# Patient Record
Sex: FEMALE | Race: BLACK OR AFRICAN AMERICAN | NOT HISPANIC OR LATINO | Employment: FULL TIME | URBAN - METROPOLITAN AREA
[De-identification: names, ages, dates, MRNs, and addresses within clinical notes are randomized per-mention and may not be internally consistent; named-entity substitution may affect disease eponyms.]

---

## 2016-08-23 LAB — HPV 16/18: NOT DETECTED

## 2020-03-09 LAB
LEFT EYE DM RETINOPATHY: NEGATIVE
RIGHT EYE DM RETINOPATHY: NEGATIVE

## 2020-06-01 ENCOUNTER — TELEPHONE (OUTPATIENT)
Dept: FAMILY MEDICINE | Facility: CLINIC | Age: 60
End: 2020-06-01

## 2020-06-01 RX ORDER — ATORVASTATIN CALCIUM 40 MG/1
40 TABLET, FILM COATED ORAL NIGHTLY
Qty: 90 TABLET | Refills: 0 | Status: SHIPPED | OUTPATIENT
Start: 2020-06-01 | End: 2020-06-18 | Stop reason: SDUPTHER

## 2020-06-01 RX ORDER — ATORVASTATIN CALCIUM 40 MG/1
TABLET, FILM COATED ORAL
COMMUNITY
Start: 2020-04-12 | End: 2020-06-01 | Stop reason: SDUPTHER

## 2020-06-01 RX ORDER — LISINOPRIL AND HYDROCHLOROTHIAZIDE 12.5; 2 MG/1; MG/1
1 TABLET ORAL DAILY
Qty: 90 TABLET | Refills: 0 | Status: SHIPPED | OUTPATIENT
Start: 2020-06-01 | End: 2020-06-18 | Stop reason: SDUPTHER

## 2020-06-01 RX ORDER — GABAPENTIN 300 MG/1
300 CAPSULE ORAL 2 TIMES DAILY
COMMUNITY
Start: 2020-04-22 | End: 2020-06-18 | Stop reason: SDUPTHER

## 2020-06-01 RX ORDER — LISINOPRIL AND HYDROCHLOROTHIAZIDE 12.5; 2 MG/1; MG/1
TABLET ORAL DAILY
COMMUNITY
Start: 2020-04-12 | End: 2020-06-01 | Stop reason: SDUPTHER

## 2020-06-01 NOTE — TELEPHONE ENCOUNTER
----- Message from Hawk Fermin sent at 6/1/2020  9:29 AM CDT -----  Contact: Pt  Pt would like to be called back asap regarding scheduling an earlier NPP appt due a need for medication refill.   Former pt of Dr. Lester at [facility].    Pt can be reached at [phone] or [phone](work).    Thanks

## 2020-06-04 ENCOUNTER — PATIENT OUTREACH (OUTPATIENT)
Dept: ADMINISTRATIVE | Facility: HOSPITAL | Age: 60
End: 2020-06-04

## 2020-06-04 ENCOUNTER — TELEPHONE (OUTPATIENT)
Dept: ADMINISTRATIVE | Facility: HOSPITAL | Age: 60
End: 2020-06-04

## 2020-06-09 ENCOUNTER — TELEPHONE (OUTPATIENT)
Dept: ADMINISTRATIVE | Facility: HOSPITAL | Age: 60
End: 2020-06-09

## 2020-06-18 ENCOUNTER — TELEPHONE (OUTPATIENT)
Dept: ADMINISTRATIVE | Facility: HOSPITAL | Age: 60
End: 2020-06-18

## 2020-06-18 ENCOUNTER — PATIENT OUTREACH (OUTPATIENT)
Dept: ADMINISTRATIVE | Facility: HOSPITAL | Age: 60
End: 2020-06-18

## 2020-06-18 ENCOUNTER — OFFICE VISIT (OUTPATIENT)
Dept: FAMILY MEDICINE | Facility: CLINIC | Age: 60
End: 2020-06-18
Payer: COMMERCIAL

## 2020-06-18 VITALS
HEART RATE: 69 BPM | DIASTOLIC BLOOD PRESSURE: 80 MMHG | SYSTOLIC BLOOD PRESSURE: 122 MMHG | OXYGEN SATURATION: 98 % | WEIGHT: 237.75 LBS | TEMPERATURE: 98 F

## 2020-06-18 DIAGNOSIS — Z11.59 SCREENING FOR VIRAL DISEASE: ICD-10-CM

## 2020-06-18 DIAGNOSIS — E78.5 HYPERLIPIDEMIA ASSOCIATED WITH TYPE 2 DIABETES MELLITUS: ICD-10-CM

## 2020-06-18 DIAGNOSIS — R00.2 PALPITATIONS: ICD-10-CM

## 2020-06-18 DIAGNOSIS — E11.59 HYPERTENSION ASSOCIATED WITH DIABETES: ICD-10-CM

## 2020-06-18 DIAGNOSIS — E11.69 HYPERLIPIDEMIA ASSOCIATED WITH TYPE 2 DIABETES MELLITUS: ICD-10-CM

## 2020-06-18 DIAGNOSIS — I15.2 HYPERTENSION ASSOCIATED WITH DIABETES: ICD-10-CM

## 2020-06-18 DIAGNOSIS — E11.9 TYPE 2 DIABETES MELLITUS WITHOUT COMPLICATION, WITHOUT LONG-TERM CURRENT USE OF INSULIN: Primary | ICD-10-CM

## 2020-06-18 PROCEDURE — 99999 PR PBB SHADOW E&M-EST. PATIENT-LVL III: CPT | Mod: PBBFAC,,, | Performed by: INTERNAL MEDICINE

## 2020-06-18 PROCEDURE — 99214 PR OFFICE/OUTPT VISIT, EST, LEVL IV, 30-39 MIN: ICD-10-PCS | Mod: S$GLB,,, | Performed by: INTERNAL MEDICINE

## 2020-06-18 PROCEDURE — 99999 PR PBB SHADOW E&M-EST. PATIENT-LVL III: ICD-10-PCS | Mod: PBBFAC,,, | Performed by: INTERNAL MEDICINE

## 2020-06-18 PROCEDURE — 99214 OFFICE O/P EST MOD 30 MIN: CPT | Mod: S$GLB,,, | Performed by: INTERNAL MEDICINE

## 2020-06-18 RX ORDER — LISINOPRIL AND HYDROCHLOROTHIAZIDE 12.5; 2 MG/1; MG/1
1 TABLET ORAL DAILY
Qty: 90 TABLET | Refills: 3 | Status: SHIPPED | OUTPATIENT
Start: 2020-06-18 | End: 2020-12-17 | Stop reason: SDUPTHER

## 2020-06-18 RX ORDER — GABAPENTIN 300 MG/1
300 CAPSULE ORAL 2 TIMES DAILY
Qty: 180 CAPSULE | Refills: 3 | Status: SHIPPED | OUTPATIENT
Start: 2020-06-18 | End: 2020-12-17 | Stop reason: SDUPTHER

## 2020-06-18 RX ORDER — ASPIRIN 81 MG/1
81 TABLET ORAL DAILY
COMMUNITY

## 2020-06-18 RX ORDER — ATORVASTATIN CALCIUM 40 MG/1
40 TABLET, FILM COATED ORAL NIGHTLY
Qty: 90 TABLET | Refills: 3 | Status: SHIPPED | OUTPATIENT
Start: 2020-06-18 | End: 2020-12-17 | Stop reason: SDUPTHER

## 2020-06-18 RX ORDER — METFORMIN HYDROCHLORIDE 1000 MG/1
1000 TABLET, FILM COATED, EXTENDED RELEASE ORAL
Qty: 90 TABLET | Refills: 3 | Status: SHIPPED | OUTPATIENT
Start: 2020-06-18 | End: 2020-12-17 | Stop reason: SDUPTHER

## 2020-06-18 RX ORDER — METFORMIN HYDROCHLORIDE 1000 MG/1
1000 TABLET, FILM COATED, EXTENDED RELEASE ORAL
COMMUNITY
End: 2020-06-18 | Stop reason: SDUPTHER

## 2020-06-18 NOTE — PROGRESS NOTES
[facility] Note    Chief Complaint      Chief Complaint   Patient presents with    Establish Care     f/u from       History of Present Illness      Stephania Rodgers is a 59 y.o. female who presents today to establish care for HLD, HTN.  Patient comes to appointment alone.  Previous patient of mine at .    Feels depressed, uninterested.  Sleeping more. Has a good support system.  Was exercising, has not been lately.  Problem List Items Addressed This Visit     Hypertension associated with diabetes    Current Assessment & Plan     BP controlled on lisinopril/HCTZ 20/12.5 mg daily, no CP/SOB/HA.         Relevant Medications    metFORMIN (GLUMETZA) 1000 MG (MOD) 24hr tablet    lisinopriL-hydrochlorothiazide (PRINZIDE,ZESTORETIC) 20-12.5 mg per tablet    Hyperlipidemia associated with type 2 diabetes mellitus    Current Assessment & Plan     Stable on lipitor 40 mg daily, no myalgias.         Relevant Medications    metFORMIN (GLUMETZA) 1000 MG (MOD) 24hr tablet    atorvastatin (LIPITOR) 40 MG tablet    Type 2 diabetes mellitus without complication, without long-term current use of insulin - Primary    Current Assessment & Plan     A1C 7.6 with Dr. John 5/2020, has not been compliant with metformin 1000 mg daily.  No hypoglycemia, takes neurontin for neuropathy.         Relevant Medications    metFORMIN (GLUMETZA) 1000 MG (MOD) 24hr tablet    gabapentin (NEURONTIN) 300 MG capsule    Other Relevant Orders    CBC auto differential    Comprehensive metabolic panel    Lipid Panel    Microalbumin/creatinine urine ratio    Hemoglobin A1C    TSH    Palpitations    Current Assessment & Plan     Had holter monitor recently with Dr. John, which showed some PVC's.           Other Visit Diagnoses     Screening for viral disease        Relevant Orders    Hepatitis C Antibody    HIV 1/2 Ag/Ab (4th Gen)          Health Maintenance   Topic Date Due    Hepatitis C Screening  1960    Lipid  Panel  1960    Eye Exam  11/26/1969    Foot Exam  06/18/2021    Pap Smear with HPV Cotest  08/23/2021    Mammogram  01/11/2022    TETANUS VACCINE  05/17/2029       History reviewed. No pertinent past medical history.    Past Surgical History:   Procedure Laterality Date    colonosocpy  colonoscopy    Polyp 2 mm in the descending colon. Repeat 5 years    FOOT SURGERY      MANDIBLE FRACTURE SURGERY         family history includes Diabetes in her brother, father, and mother; Heart disease in her father; Hyperlipidemia in her father and mother; Hypertension in her brother, father, and mother.    Social History     Tobacco Use    Smoking status: Never Smoker   Substance Use Topics    Alcohol use: Yes     Alcohol/week: 3.0 standard drinks     Types: 3 Shots of liquor per week    Drug use: Never       Review of Systems   Constitutional: Negative for chills and fever.   HENT: Negative for congestion and sore throat.    Eyes: Negative for blurred vision and discharge.   Respiratory: Negative for cough and shortness of breath.    Cardiovascular: Negative for chest pain and palpitations.   Gastrointestinal: Negative for constipation, diarrhea, nausea and vomiting.   Genitourinary: Negative for dysuria and hematuria.   Musculoskeletal: Negative for falls and myalgias.   Skin: Negative for itching and rash.   Neurological: Negative for dizziness and headaches.        Outpatient Encounter Medications as of 6/18/2020   Medication Sig Dispense Refill    aspirin (ECOTRIN) 81 MG EC tablet Take 81 mg by mouth once daily.      atorvastatin (LIPITOR) 40 MG tablet Take 1 tablet (40 mg total) by mouth every evening. 90 tablet 3    gabapentin (NEURONTIN) 300 MG capsule Take 1 capsule (300 mg total) by mouth 2 (two) times daily. 180 capsule 3    lisinopriL-hydrochlorothiazide (PRINZIDE,ZESTORETIC) 20-12.5 mg per tablet Take 1 tablet by mouth once daily. 90 tablet 3    metFORMIN (GLUMETZA) 1000 MG (MOD) 24hr tablet Take  1 tablet (1,000 mg total) by mouth daily with breakfast. 90 tablet 3    [DISCONTINUED] atorvastatin (LIPITOR) 40 MG tablet Take 1 tablet (40 mg total) by mouth every evening. 90 tablet 0    [DISCONTINUED] gabapentin (NEURONTIN) 300 MG capsule Take 300 mg by mouth 2 (two) times daily.       [DISCONTINUED] lisinopriL-hydrochlorothiazide (PRINZIDE,ZESTORETIC) 20-12.5 mg per tablet Take 1 tablet by mouth once daily. 90 tablet 0    [DISCONTINUED] metFORMIN (GLUMETZA) 1000 MG (MOD) 24hr tablet Take 1,000 mg by mouth daily with breakfast.       No facility-administered encounter medications on file as of 6/18/2020.         Review of patient's allergies indicates:  No Known Allergies    Physical Exam      Vital Signs  Temp: 97.5 °F (36.4 °C)  Temp src: Oral  Pulse: 69  SpO2: 98 %  BP: 122/80  BP Location: Left arm  Patient Position: Sitting  Pain Score: 0-No pain  Height and Weight  Weight: 107.9 kg (237 lb 12.3 oz)]    Physical Exam  Constitutional:       Appearance: She is well-developed.   HENT:      Head: Normocephalic and atraumatic.      Right Ear: External ear normal.      Left Ear: External ear normal.   Eyes:      General:         Right eye: No discharge.         Left eye: No discharge.   Neck:      Musculoskeletal: Normal range of motion.      Thyroid: No thyromegaly.   Cardiovascular:      Rate and Rhythm: Normal rate and regular rhythm.      Heart sounds: Normal heart sounds. No murmur.   Pulmonary:      Effort: Pulmonary effort is normal. No respiratory distress.      Breath sounds: Normal breath sounds.   Abdominal:      General: Bowel sounds are normal. There is no distension.      Palpations: Abdomen is soft.      Tenderness: There is no abdominal tenderness.   Musculoskeletal: Normal range of motion.         General: No deformity.   Skin:     General: Skin is warm and dry.      Findings: No rash.   Neurological:      Mental Status: She is alert and oriented to person, place, and time.   Psychiatric:          Behavior: Behavior normal.          Laboratory:  CBC:  No results for input(s): WBC, RBC, HGB, HCT, PLT, MCV, MCH, MCHC in the last 2160 hours.  CMP:  No results for input(s): GLU, CALCIUM, ALBUMIN, PROT, NA, K, CO2, CL, BUN, ALKPHOS, ALT, AST, BILITOT in the last 2160 hours.    Invalid input(s): CREATININ  URINALYSIS:  No results for input(s): COLORU, CLARITYU, SPECGRAV, PHUR, PROTEINUA, GLUCOSEU, BILIRUBINCON, BLOODU, WBCU, RBCU, BACTERIA, MUCUS, NITRITE, LEUKOCYTESUR, UROBILINOGEN, HYALINECASTS in the last 2160 hours.   LIPIDS:  No results for input(s): TSH, HDL, CHOL, TRIG, LDLCALC, CHOLHDL, NONHDLCHOL, TOTALCHOLEST in the last 2160 hours.  TSH:  No results for input(s): TSH in the last 2160 hours.  A1C:  No results for input(s): HGBA1C in the last 2160 hours.    Radiology:  No new imaging on file    Assessment/Plan     Stephania Rodgers is a 59 y.o.female with:    1. Type 2 diabetes mellitus without complication, without long-term current use of insulin  - CBC auto differential; Future  - Comprehensive metabolic panel; Future  - Lipid Panel; Future  - Microalbumin/creatinine urine ratio; Future  - Hemoglobin A1C; Future  - TSH; Future  - CBC auto differential  - Comprehensive metabolic panel  - Lipid Panel  - Microalbumin/creatinine urine ratio  - Hemoglobin A1C  - TSH  - metFORMIN (GLUMETZA) 1000 MG (MOD) 24hr tablet; Take 1 tablet (1,000 mg total) by mouth daily with breakfast.  Dispense: 90 tablet; Refill: 3  - gabapentin (NEURONTIN) 300 MG capsule; Take 1 capsule (300 mg total) by mouth 2 (two) times daily.  Dispense: 180 capsule; Refill: 3    2. Palpitations    3. Hypertension associated with diabetes  - lisinopriL-hydrochlorothiazide (PRINZIDE,ZESTORETIC) 20-12.5 mg per tablet; Take 1 tablet by mouth once daily.  Dispense: 90 tablet; Refill: 3    4. Hyperlipidemia associated with type 2 diabetes mellitus  - atorvastatin (LIPITOR) 40 MG tablet; Take 1 tablet (40 mg total) by mouth every evening.   Dispense: 90 tablet; Refill: 3    5. Screening for viral disease  - Hepatitis C Antibody; Future  - HIV 1/2 Ag/Ab (4th Gen); Future  - Hepatitis C Antibody  - HIV 1/2 Ag/Ab (4th Gen)    -get eye exam from Dr. Neely  -Counseled on increasing exercise  -Counseled on taking metformin  -will find out from [employer] which glucometer is covered on her plan  -Get 5/2020 labs-Continue current medications and maintain follow up with specialists.  Return to clinic in 6 months with labs prior at [facility].      Galina Lester MD  [facility]

## 2020-06-26 ENCOUNTER — TELEPHONE (OUTPATIENT)
Dept: FAMILY MEDICINE | Facility: CLINIC | Age: 60
End: 2020-06-26

## 2020-06-29 ENCOUNTER — TELEPHONE (OUTPATIENT)
Dept: FAMILY MEDICINE | Facility: CLINIC | Age: 60
End: 2020-06-29

## 2020-06-29 NOTE — TELEPHONE ENCOUNTER
----- Message from Alisha Dempsey sent at 6/29/2020 11:28 AM CDT -----  Regarding: Medical Necessity - megha  Contact: [facility] [phone]  Confirmation for medical necessity for diabetic supplies

## 2020-07-07 ENCOUNTER — PATIENT OUTREACH (OUTPATIENT)
Dept: ADMINISTRATIVE | Facility: HOSPITAL | Age: 60
End: 2020-07-07

## 2020-07-07 ENCOUNTER — TELEPHONE (OUTPATIENT)
Dept: ADMINISTRATIVE | Facility: HOSPITAL | Age: 60
End: 2020-07-07

## 2020-07-27 ENCOUNTER — TELEPHONE (OUTPATIENT)
Dept: FAMILY MEDICINE | Facility: CLINIC | Age: 60
End: 2020-07-27

## 2020-07-27 RX ORDER — ALPRAZOLAM 0.25 MG/1
0.25 TABLET ORAL DAILY PRN
Qty: 30 TABLET | Refills: 0 | Status: SHIPPED | OUTPATIENT
Start: 2020-07-27 | End: 2020-09-08

## 2020-07-27 NOTE — TELEPHONE ENCOUNTER
Will give short term rx for xanax PRN.  If she is taking this daily for an extended period of time, needs to set up appt so we can discuss long term management of anxiety.

## 2020-07-27 NOTE — TELEPHONE ENCOUNTER
----- Message from Josr Olvera sent at 7/27/2020  9:27 AM CDT -----  Regarding: Advice  Contact: Patient [phone]  Patient would like to get medical advice.     Comments: Patient calling stating is needing to speak regarding Rx and other questions will discuss with call back.    Please call an advise  Thank you

## 2020-12-17 ENCOUNTER — OFFICE VISIT (OUTPATIENT)
Dept: FAMILY MEDICINE | Facility: CLINIC | Age: 60
End: 2020-12-17
Payer: COMMERCIAL

## 2020-12-17 VITALS
SYSTOLIC BLOOD PRESSURE: 118 MMHG | TEMPERATURE: 97 F | HEART RATE: 61 BPM | DIASTOLIC BLOOD PRESSURE: 82 MMHG | OXYGEN SATURATION: 98 % | WEIGHT: 231.13 LBS

## 2020-12-17 DIAGNOSIS — E78.5 HYPERLIPIDEMIA ASSOCIATED WITH TYPE 2 DIABETES MELLITUS: ICD-10-CM

## 2020-12-17 DIAGNOSIS — I15.2 HYPERTENSION ASSOCIATED WITH DIABETES: ICD-10-CM

## 2020-12-17 DIAGNOSIS — E11.59 HYPERTENSION ASSOCIATED WITH DIABETES: ICD-10-CM

## 2020-12-17 DIAGNOSIS — E11.9 TYPE 2 DIABETES MELLITUS WITHOUT COMPLICATION, WITHOUT LONG-TERM CURRENT USE OF INSULIN: ICD-10-CM

## 2020-12-17 DIAGNOSIS — F41.9 ANXIETY: ICD-10-CM

## 2020-12-17 DIAGNOSIS — E11.69 HYPERLIPIDEMIA ASSOCIATED WITH TYPE 2 DIABETES MELLITUS: ICD-10-CM

## 2020-12-17 DIAGNOSIS — Z11.59 SCREENING FOR VIRAL DISEASE: Primary | ICD-10-CM

## 2020-12-17 DIAGNOSIS — R00.2 PALPITATIONS: ICD-10-CM

## 2020-12-17 DIAGNOSIS — Z11.4 SCREENING FOR HIV (HUMAN IMMUNODEFICIENCY VIRUS): ICD-10-CM

## 2020-12-17 PROCEDURE — 99214 PR OFFICE/OUTPT VISIT, EST, LEVL IV, 30-39 MIN: ICD-10-PCS | Mod: S$GLB,,, | Performed by: INTERNAL MEDICINE

## 2020-12-17 PROCEDURE — 99214 OFFICE O/P EST MOD 30 MIN: CPT | Mod: S$GLB,,, | Performed by: INTERNAL MEDICINE

## 2020-12-17 PROCEDURE — 99999 PR PBB SHADOW E&M-EST. PATIENT-LVL III: ICD-10-PCS | Mod: PBBFAC,,, | Performed by: INTERNAL MEDICINE

## 2020-12-17 PROCEDURE — 99999 PR PBB SHADOW E&M-EST. PATIENT-LVL III: CPT | Mod: PBBFAC,,, | Performed by: INTERNAL MEDICINE

## 2020-12-17 RX ORDER — ATORVASTATIN CALCIUM 40 MG/1
40 TABLET, FILM COATED ORAL NIGHTLY
Qty: 90 TABLET | Refills: 3 | Status: SHIPPED | OUTPATIENT
Start: 2020-12-17 | End: 2021-12-23 | Stop reason: SDUPTHER

## 2020-12-17 RX ORDER — METFORMIN HYDROCHLORIDE 1000 MG/1
1000 TABLET, FILM COATED, EXTENDED RELEASE ORAL
Qty: 90 TABLET | Refills: 3 | Status: SHIPPED | OUTPATIENT
Start: 2020-12-17 | End: 2020-12-20 | Stop reason: SDUPTHER

## 2020-12-17 RX ORDER — ALPRAZOLAM 0.5 MG/1
0.5 TABLET ORAL DAILY PRN
Qty: 30 TABLET | Refills: 5 | Status: SHIPPED | OUTPATIENT
Start: 2020-12-17 | End: 2021-08-26

## 2020-12-17 RX ORDER — LISINOPRIL AND HYDROCHLOROTHIAZIDE 12.5; 2 MG/1; MG/1
1 TABLET ORAL DAILY
Qty: 90 TABLET | Refills: 3 | Status: SHIPPED | OUTPATIENT
Start: 2020-12-17 | End: 2021-12-23 | Stop reason: SDUPTHER

## 2020-12-17 RX ORDER — GABAPENTIN 300 MG/1
300 CAPSULE ORAL 2 TIMES DAILY
Qty: 180 CAPSULE | Refills: 3 | Status: SHIPPED | OUTPATIENT
Start: 2020-12-17 | End: 2021-12-23 | Stop reason: SDUPTHER

## 2020-12-17 NOTE — ASSESSMENT & PLAN NOTE
A1C 7.6 with Dr. John 5/2020, has not been compliant with metformin 1000 mg daily.  No hypoglycemia, takes neurontin for neuropathy.

## 2020-12-17 NOTE — ASSESSMENT & PLAN NOTE
had stroke 7/21/2020, mom had hand amputated a few weeks prior then broke her ankle.  Still working full time.  Was under a lot of stress.  Put on low dose xanax by me at last visit, feels like it doesn't help as much.  Was on lexapro years ago, felt like it made her like a zombie.

## 2020-12-17 NOTE — PROGRESS NOTES
[facility] Note    Chief Complaint      Chief Complaint   Patient presents with    Follow-up    Diabetes     History of Present Illness      Stephania Rodgers is a 60 y.o. female who presents today for HLD, HTN.  Patient comes to appointment alone.  GYN: Dr. Alisa Whitman    Problem List Items Addressed This Visit     Hypertension associated with diabetes    Current Assessment & Plan     BP controlled on lisinopril/HCTZ 20/12.5 mg daily, no CP/SOB/HA.         Hyperlipidemia associated with type 2 diabetes mellitus    Current Assessment & Plan     Stable on lipitor 40 mg daily, no myalgias.         Type 2 diabetes mellitus without complication, without long-term current use of insulin    Current Assessment & Plan     A1C 7.6 with Dr. John 5/2020, has not been compliant with metformin 1000 mg daily.  No hypoglycemia, takes neurontin for neuropathy.         Palpitations    Current Assessment & Plan     Had holter monitor in 2020 with Dr. John, which showed some PVC's.               Health Maintenance   Topic Date Due    Hepatitis C Screening  1960    Lipid Panel  1960    Eye Exam  03/09/2021    Foot Exam  06/18/2021    Pap Smear with HPV Cotest  08/23/2021    Mammogram  01/11/2022    TETANUS VACCINE  05/17/2029       History reviewed. No pertinent past medical history.    Past Surgical History:   Procedure Laterality Date    colonosocpy  colonoscopy    Polyp 2 mm in the descending colon. Repeat 5 years    FOOT SURGERY      MANDIBLE FRACTURE SURGERY         family history includes Diabetes in her brother, father, and mother; Heart disease in her father; Hyperlipidemia in her father and mother; Hypertension in her brother, father, and mother.    Social History     Tobacco Use    Smoking status: Never Smoker   Substance Use Topics    Alcohol use: Yes     Alcohol/week: 3.0 standard drinks     Types: 3 Shots of liquor per week    Drug use: Never       Review of  Systems   Constitutional: Negative for chills and fever.   HENT: Negative for congestion and sore throat.    Eyes: Negative for blurred vision and discharge.   Respiratory: Negative for cough and shortness of breath.    Cardiovascular: Negative for chest pain and palpitations.   Gastrointestinal: Negative for constipation, diarrhea, nausea and vomiting.   Genitourinary: Negative for dysuria and hematuria.   Musculoskeletal: Negative for falls and myalgias.   Skin: Negative for itching and rash.   Neurological: Negative for dizziness and headaches.        Outpatient Encounter Medications as of 12/17/2020   Medication Sig Dispense Refill    ALPRAZolam (XANAX) 0.25 MG tablet TAKE 1 TABLET BY MOUTH ONCE DAILY AS NEEDED FOR ANXIETY 30 tablet 5    aspirin (ECOTRIN) 81 MG EC tablet Take 81 mg by mouth once daily.      atorvastatin (LIPITOR) 40 MG tablet Take 1 tablet (40 mg total) by mouth every evening. 90 tablet 3    gabapentin (NEURONTIN) 300 MG capsule Take 1 capsule (300 mg total) by mouth 2 (two) times daily. 180 capsule 3    lisinopriL-hydrochlorothiazide (PRINZIDE,ZESTORETIC) 20-12.5 mg per tablet Take 1 tablet by mouth once daily. 90 tablet 3    metFORMIN (GLUMETZA) 1000 MG (MOD) 24hr tablet Take 1 tablet (1,000 mg total) by mouth daily with breakfast. 90 tablet 3     No facility-administered encounter medications on file as of 12/17/2020.         Review of patient's allergies indicates:  No Known Allergies    Physical Exam      Vital Signs  Temp: 96.8 °F (36 °C)  Temp src: Temporal  Pulse: 61  SpO2: 98 %  BP: 118/82  BP Location: Left arm  Patient Position: Sitting  Pain Score: 0-No pain  Height and Weight  Weight: 104.9 kg (231 lb 2.4 oz)]    Physical Exam  Constitutional:       Appearance: She is well-developed.   HENT:      Head: Normocephalic and atraumatic.      Right Ear: External ear normal.      Left Ear: External ear normal.   Eyes:      General:         Right eye: No discharge.         Left eye:  No discharge.   Neck:      Musculoskeletal: Normal range of motion.      Thyroid: No thyromegaly.   Cardiovascular:      Rate and Rhythm: Normal rate and regular rhythm.      Heart sounds: Normal heart sounds. No murmur.   Pulmonary:      Effort: Pulmonary effort is normal. No respiratory distress.      Breath sounds: Normal breath sounds.   Abdominal:      General: Bowel sounds are normal. There is no distension.      Palpations: Abdomen is soft.      Tenderness: There is no abdominal tenderness.   Musculoskeletal: Normal range of motion.         General: No deformity.   Skin:     General: Skin is warm and dry.      Findings: No rash.   Neurological:      Mental Status: She is alert and oriented to person, place, and time.   Psychiatric:         Behavior: Behavior normal.          Laboratory:  CBC:  No results for input(s): WBC, RBC, HGB, HCT, PLT, MCV, MCH, MCHC in the last 2160 hours.  CMP:  No results for input(s): GLU, CALCIUM, ALBUMIN, PROT, NA, K, CO2, CL, BUN, ALKPHOS, ALT, AST, BILITOT in the last 2160 hours.    Invalid input(s): CREATININ  URINALYSIS:  No results for input(s): COLORU, CLARITYU, SPECGRAV, PHUR, PROTEINUA, GLUCOSEU, BILIRUBINCON, BLOODU, WBCU, RBCU, BACTERIA, MUCUS, NITRITE, LEUKOCYTESUR, UROBILINOGEN, HYALINECASTS in the last 2160 hours.   LIPIDS:  No results for input(s): TSH, HDL, CHOL, TRIG, LDLCALC, CHOLHDL, NONHDLCHOL, TOTALCHOLEST in the last 2160 hours.  TSH:  No results for input(s): TSH in the last 2160 hours.  A1C:  No results for input(s): HGBA1C in the last 2160 hours.    Radiology:  No new imaging on file    Assessment/Plan     Stephania Rodgers is a 60 y.o.female with:    1. Type 2 diabetes mellitus without complication, without long-term current use of insulin    2. Palpitations    3. Hypertension associated with diabetes    4. Hyperlipidemia associated with type 2 diabetes mellitus        -  -will find out from [employer] which glucometer is covered on her plan  -Did not do labs  prior to appt at [facility], counseled to go get them done  -Continue current medications and maintain follow up with specialists.  Return to clinic in 6 months with labs prior at [facility].      Galina Lester MD  [facility] [place]

## 2020-12-20 ENCOUNTER — TELEPHONE (OUTPATIENT)
Dept: FAMILY MEDICINE | Facility: CLINIC | Age: 60
End: 2020-12-20

## 2020-12-20 DIAGNOSIS — E11.9 TYPE 2 DIABETES MELLITUS WITHOUT COMPLICATION, WITHOUT LONG-TERM CURRENT USE OF INSULIN: ICD-10-CM

## 2020-12-20 RX ORDER — METFORMIN HYDROCHLORIDE 500 MG/1
TABLET, EXTENDED RELEASE ORAL
Qty: 180 TABLET | Refills: 3 | Status: SHIPPED | OUTPATIENT
Start: 2020-12-20 | End: 2021-12-23

## 2020-12-21 LAB
RANDOM URINE: 1.4 MG/DL
RANDOM URINE: 289.5 MG/DL
RANDOM URINE: 5 MCG/MG CREAT (ref 0–29)

## 2020-12-21 NOTE — TELEPHONE ENCOUNTER
Pharmacy faxed paper work asking if we can switch pts Metformin from 1,000mg to 5000 mg ER. States the 1,000mg is too expensive.

## 2020-12-22 LAB
ALBUMIN: 4.6 GRAM/DL (ref 3.5–5)
ALP SERPL-CCNC: 115 UNIT/L (ref 38–126)
ALT SERPL W P-5'-P-CCNC: 12 UNIT/L (ref 7–56)
ANION GAP SERPL CALC-SCNC: 16 MEQ/L (ref 9–18)
AST SERPL-CCNC: 18 UNIT/L (ref 7–40)
BASOPHILS ABSOLUTE COUNT: 0 K/UL (ref 0–0.2)
BASOPHILS NFR BLD: 0.4 % (ref 0–2)
BILIRUB SERPL-MCNC: 0.9 MG/DL (ref 0–1.2)
BUN BLD-MCNC: 12 MG/DL (ref 7–21)
BUN/CREAT SERPL: 15 RATIO (ref 6–22)
CALC OSMOLALITY: 280 MOSM/KG (ref 275–295)
CALCIUM SERPL-MCNC: 9.8 MG/DL (ref 8.5–10.3)
CHLORIDE SERPL-SCNC: 102 MEQ/L (ref 98–107)
CHOL/HDLC RATIO: 3
CHOLEST SERPL-MSCNC: 160 MG/DL (ref 100–200)
CO2 SERPL-SCNC: 26 MEQ/L (ref 21–31)
CREAT SERPL-MCNC: 0.8 MG/DL (ref 0.5–1)
DIFFERENTIAL TYPE: ABNORMAL
EOSINOPHIL NFR BLD: 1.4 % (ref 0–4)
EOSINOPHILS ABSOLUTE COUNT: 0.1 K/UL (ref 0–0.7)
ERYTHROCYTE [DISTWIDTH] IN BLOOD BY AUTOMATED COUNT: 14.8 GRAM/DL (ref 12–15.3)
GFR: 70.9 ML/MIN/1.73M2
GLUCOSE SERPL-MCNC: 118 MG/DL (ref 70–100)
HBA1C MFR BLD: 6.8 % (ref 4.5–5.7)
HCT VFR BLD AUTO: 42.3 % (ref 37–47)
HCV AB S/CO SERPL IA: 0.01
HCV AB SERPL QL IA: NORMAL
HDLC SERPL-MCNC: 56 MG/DL (ref 40–75)
HGB BLD-MCNC: 13.8 GRAM/DL (ref 12–16)
HIV 1+2 AB+HIV1 P24 AG SERPL QL IA: NORMAL
LDLC SERPL CALC-MCNC: 94 MG/DL (ref 0–125)
LYMPHOCYTES %: 36.2 % (ref 15–45)
LYMPHOCYTES ABSOLUTE COUNT: 2 K/UL (ref 1–4.2)
MCH RBC QN AUTO: 25.3 PICOGRAM (ref 27–33)
MCHC RBC AUTO-ENTMCNC: 32.7 GRAM/DL (ref 32–36)
MCV RBC AUTO: 77.3 FEMTOLITER (ref 81–99)
MONOCYTES %: 8.4 % (ref 3–13)
MONOCYTES ABSOLUTE COUNT: 0.5 K/UL (ref 0.1–0.8)
NEUTROPHILS ABSOLUTE COUNT: 2.9 K/UL (ref 2.1–7.6)
NEUTROPHILS RELATIVE PERCENT: 53.6 % (ref 32–80)
NONHDLC SERPL-MCNC: 104 MG/DL (ref 60–125)
PLATELET # BLD AUTO: 327 K/UL (ref 150–350)
PMV BLD AUTO: 8.3 FEMTOLITER (ref 7–10.2)
POTASSIUM SERPL-SCNC: 4 MEQ/L (ref 3.5–5)
RBC # BLD AUTO: 5.47 MIL/UL (ref 4.2–5.4)
SODIUM BLD-SCNC: 140 MEQ/L (ref 135–145)
TOTAL PROTEIN: 7.4 GRAM/DL (ref 6.3–8.2)
TRIGL SERPL-MCNC: 63 MG/DL (ref 30–150)
TSH SERPL DL<=0.005 MIU/L-ACNC: 1.58 UIL/ML (ref 0.35–4)
WBC # BLD AUTO: 5.5 K/UL (ref 4.5–11)

## 2021-01-20 DIAGNOSIS — Z12.31 OTHER SCREENING MAMMOGRAM: ICD-10-CM

## 2021-02-17 ENCOUNTER — TELEPHONE (OUTPATIENT)
Dept: FAMILY MEDICINE | Facility: CLINIC | Age: 61
End: 2021-02-17

## 2021-04-05 ENCOUNTER — PATIENT MESSAGE (OUTPATIENT)
Dept: ADMINISTRATIVE | Facility: HOSPITAL | Age: 61
End: 2021-04-05

## 2021-05-26 DIAGNOSIS — E11.9 TYPE 2 DIABETES MELLITUS WITHOUT COMPLICATION, UNSPECIFIED WHETHER LONG TERM INSULIN USE: ICD-10-CM

## 2021-06-01 ENCOUNTER — TELEPHONE (OUTPATIENT)
Dept: ADMINISTRATIVE | Facility: HOSPITAL | Age: 61
End: 2021-06-01

## 2021-06-01 ENCOUNTER — PATIENT OUTREACH (OUTPATIENT)
Dept: ADMINISTRATIVE | Facility: HOSPITAL | Age: 61
End: 2021-06-01

## 2021-06-03 ENCOUNTER — PATIENT OUTREACH (OUTPATIENT)
Dept: ADMINISTRATIVE | Facility: HOSPITAL | Age: 61
End: 2021-06-03

## 2021-06-03 ENCOUNTER — TELEPHONE (OUTPATIENT)
Dept: ADMINISTRATIVE | Facility: HOSPITAL | Age: 61
End: 2021-06-03

## 2021-06-17 ENCOUNTER — OFFICE VISIT (OUTPATIENT)
Dept: FAMILY MEDICINE | Facility: CLINIC | Age: 61
End: 2021-06-17
Payer: COMMERCIAL

## 2021-06-17 DIAGNOSIS — E11.69 HYPERLIPIDEMIA ASSOCIATED WITH TYPE 2 DIABETES MELLITUS: ICD-10-CM

## 2021-06-17 DIAGNOSIS — R00.2 PALPITATIONS: ICD-10-CM

## 2021-06-17 DIAGNOSIS — E11.9 TYPE 2 DIABETES MELLITUS WITHOUT COMPLICATION, WITHOUT LONG-TERM CURRENT USE OF INSULIN: Primary | ICD-10-CM

## 2021-06-17 DIAGNOSIS — F41.9 ANXIETY: ICD-10-CM

## 2021-06-17 DIAGNOSIS — I15.2 HYPERTENSION ASSOCIATED WITH DIABETES: ICD-10-CM

## 2021-06-17 DIAGNOSIS — E11.59 HYPERTENSION ASSOCIATED WITH DIABETES: ICD-10-CM

## 2021-06-17 DIAGNOSIS — E78.5 HYPERLIPIDEMIA ASSOCIATED WITH TYPE 2 DIABETES MELLITUS: ICD-10-CM

## 2021-06-17 PROCEDURE — 99214 OFFICE O/P EST MOD 30 MIN: CPT | Mod: S$GLB,,, | Performed by: INTERNAL MEDICINE

## 2021-06-17 PROCEDURE — 99999 PR PBB SHADOW E&M-EST. PATIENT-LVL III: CPT | Mod: PBBFAC,,, | Performed by: INTERNAL MEDICINE

## 2021-06-17 PROCEDURE — 99999 PR PBB SHADOW E&M-EST. PATIENT-LVL III: ICD-10-PCS | Mod: PBBFAC,,, | Performed by: INTERNAL MEDICINE

## 2021-06-17 PROCEDURE — 99214 PR OFFICE/OUTPT VISIT, EST, LEVL IV, 30-39 MIN: ICD-10-PCS | Mod: S$GLB,,, | Performed by: INTERNAL MEDICINE

## 2021-06-17 RX ORDER — ESCITALOPRAM OXALATE 5 MG/1
5 TABLET ORAL DAILY
Qty: 90 TABLET | Refills: 3 | Status: SHIPPED | OUTPATIENT
Start: 2021-06-17 | End: 2021-12-23 | Stop reason: SDUPTHER

## 2021-06-21 LAB
ALBUMIN: 4.7 GRAM/DL (ref 3.5–5)
ALP SERPL-CCNC: 106 UNIT/L (ref 38–126)
ALT SERPL W P-5'-P-CCNC: 15 UNIT/L (ref 7–56)
ANION GAP SERPL CALC-SCNC: 15 MEQ/L (ref 9–18)
AST SERPL-CCNC: 19 UNIT/L (ref 7–40)
BASOPHILS ABSOLUTE COUNT: 0 K/UL (ref 0–0.2)
BASOPHILS NFR BLD: 0.4 % (ref 0–2)
BILIRUB SERPL-MCNC: 0.5 MG/DL (ref 0–1.2)
BUN BLD-MCNC: 13 MG/DL (ref 7–21)
BUN/CREAT SERPL: 16 RATIO (ref 6–22)
CALC OSMOLALITY: 283 MOSM/KG (ref 275–295)
CALCIUM SERPL-MCNC: 9.7 MG/DL (ref 8.5–10.3)
CHLORIDE SERPL-SCNC: 102 MEQ/L (ref 98–107)
CO2 SERPL-SCNC: 28 MEQ/L (ref 21–31)
CREAT SERPL-MCNC: 0.8 MG/DL (ref 0.5–1)
DIFFERENTIAL TYPE: ABNORMAL
EOSINOPHIL NFR BLD: 1.3 % (ref 0–4)
EOSINOPHILS ABSOLUTE COUNT: 0.1 K/UL (ref 0–0.7)
ERYTHROCYTE [DISTWIDTH] IN BLOOD BY AUTOMATED COUNT: 15.4 % (ref 12–15.3)
GFR: 74 ML/MIN/1.73M2
GLUCOSE SERPL-MCNC: 135 MG/DL (ref 70–100)
HBA1C MFR BLD: 6.7 % (ref 4.5–5.7)
HCT VFR BLD AUTO: 41.2 % (ref 37–47)
HGB BLD-MCNC: 13.7 GRAM/DL (ref 12–16)
LYMPHOCYTES %: 30.3 % (ref 15–45)
LYMPHOCYTES ABSOLUTE COUNT: 1.7 K/UL (ref 1–4.2)
MCH RBC QN AUTO: 25.5 PICOGRAM (ref 27–33)
MCHC RBC AUTO-ENTMCNC: 33.2 GRAM/DL (ref 32–36)
MCV RBC AUTO: 76.8 FEMTOLITER (ref 81–99)
MONOCYTES %: 7.7 % (ref 3–13)
MONOCYTES ABSOLUTE COUNT: 0.4 K/UL (ref 0.1–0.8)
NEUTROPHILS ABSOLUTE COUNT: 3.5 K/UL (ref 2.1–7.6)
NEUTROPHILS RELATIVE PERCENT: 60.3 % (ref 32–80)
PLATELET # BLD AUTO: 332 K/UL (ref 150–350)
PMV BLD AUTO: 8.2 FEMTOLITER (ref 7–10.2)
POTASSIUM SERPL-SCNC: 4.2 MEQ/L (ref 3.5–5)
RBC # BLD AUTO: 5.37 MIL/UL (ref 4.2–5.4)
SODIUM BLD-SCNC: 141 MEQ/L (ref 135–145)
TOTAL PROTEIN: 7.2 GRAM/DL (ref 6.3–8.2)
WBC # BLD AUTO: 5.7 K/UL (ref 4.5–11)

## 2021-07-07 ENCOUNTER — PATIENT MESSAGE (OUTPATIENT)
Dept: ADMINISTRATIVE | Facility: HOSPITAL | Age: 61
End: 2021-07-07

## 2021-07-09 RX ORDER — ALPRAZOLAM 0.25 MG/1
TABLET ORAL
Qty: 30 TABLET | Refills: 0 | Status: SHIPPED | OUTPATIENT
Start: 2021-07-09 | End: 2021-08-26

## 2021-10-05 ENCOUNTER — PATIENT MESSAGE (OUTPATIENT)
Dept: ADMINISTRATIVE | Facility: HOSPITAL | Age: 61
End: 2021-10-05

## 2021-10-11 RX ORDER — ALPRAZOLAM 0.25 MG/1
TABLET ORAL
Qty: 30 TABLET | Refills: 0 | Status: SHIPPED | OUTPATIENT
Start: 2021-10-11 | End: 2021-11-30

## 2021-12-01 DIAGNOSIS — E11.9 TYPE 2 DIABETES MELLITUS WITHOUT COMPLICATION, UNSPECIFIED WHETHER LONG TERM INSULIN USE: ICD-10-CM

## 2021-12-23 ENCOUNTER — OFFICE VISIT (OUTPATIENT)
Dept: FAMILY MEDICINE | Facility: CLINIC | Age: 61
End: 2021-12-23
Payer: COMMERCIAL

## 2021-12-23 VITALS
SYSTOLIC BLOOD PRESSURE: 118 MMHG | OXYGEN SATURATION: 98 % | HEART RATE: 66 BPM | DIASTOLIC BLOOD PRESSURE: 80 MMHG | WEIGHT: 241.31 LBS | TEMPERATURE: 98 F

## 2021-12-23 DIAGNOSIS — F51.01 PRIMARY INSOMNIA: ICD-10-CM

## 2021-12-23 DIAGNOSIS — E78.5 HYPERLIPIDEMIA ASSOCIATED WITH TYPE 2 DIABETES MELLITUS: ICD-10-CM

## 2021-12-23 DIAGNOSIS — E11.59 HYPERTENSION ASSOCIATED WITH DIABETES: ICD-10-CM

## 2021-12-23 DIAGNOSIS — I15.2 HYPERTENSION ASSOCIATED WITH DIABETES: ICD-10-CM

## 2021-12-23 DIAGNOSIS — E11.9 TYPE 2 DIABETES MELLITUS WITHOUT COMPLICATION, WITHOUT LONG-TERM CURRENT USE OF INSULIN: ICD-10-CM

## 2021-12-23 DIAGNOSIS — F41.9 ANXIETY: ICD-10-CM

## 2021-12-23 DIAGNOSIS — Z00.00 ANNUAL PHYSICAL EXAM: Primary | ICD-10-CM

## 2021-12-23 DIAGNOSIS — E11.69 HYPERLIPIDEMIA ASSOCIATED WITH TYPE 2 DIABETES MELLITUS: ICD-10-CM

## 2021-12-23 PROCEDURE — 99999 PR PBB SHADOW E&M-EST. PATIENT-LVL III: ICD-10-PCS | Mod: PBBFAC,,, | Performed by: INTERNAL MEDICINE

## 2021-12-23 PROCEDURE — 99396 PREV VISIT EST AGE 40-64: CPT | Mod: S$GLB,,, | Performed by: INTERNAL MEDICINE

## 2021-12-23 PROCEDURE — 99999 PR PBB SHADOW E&M-EST. PATIENT-LVL III: CPT | Mod: PBBFAC,,, | Performed by: INTERNAL MEDICINE

## 2021-12-23 PROCEDURE — 99396 PR PREVENTIVE VISIT,EST,40-64: ICD-10-PCS | Mod: S$GLB,,, | Performed by: INTERNAL MEDICINE

## 2021-12-23 RX ORDER — ESCITALOPRAM OXALATE 5 MG/1
5 TABLET ORAL DAILY
Qty: 90 TABLET | Refills: 3 | Status: SHIPPED | OUTPATIENT
Start: 2021-12-23 | End: 2022-08-15 | Stop reason: SDUPTHER

## 2021-12-23 RX ORDER — ATORVASTATIN CALCIUM 40 MG/1
40 TABLET, FILM COATED ORAL NIGHTLY
Qty: 90 TABLET | Refills: 3 | Status: SHIPPED | OUTPATIENT
Start: 2021-12-23 | End: 2022-01-31

## 2021-12-23 RX ORDER — SEMAGLUTIDE 1.34 MG/ML
INJECTION, SOLUTION SUBCUTANEOUS
Qty: 1 PEN | Refills: 1 | Status: SHIPPED | OUTPATIENT
Start: 2021-12-23 | End: 2022-04-04

## 2021-12-23 RX ORDER — GABAPENTIN 300 MG/1
300 CAPSULE ORAL 2 TIMES DAILY
Qty: 180 CAPSULE | Refills: 3 | Status: SHIPPED | OUTPATIENT
Start: 2021-12-23 | End: 2022-10-12 | Stop reason: SDUPTHER

## 2021-12-23 RX ORDER — LISINOPRIL AND HYDROCHLOROTHIAZIDE 12.5; 2 MG/1; MG/1
1 TABLET ORAL DAILY
Qty: 90 TABLET | Refills: 3 | Status: SHIPPED | OUTPATIENT
Start: 2021-12-23 | End: 2022-10-12 | Stop reason: SDUPTHER

## 2022-01-03 ENCOUNTER — TELEPHONE (OUTPATIENT)
Dept: ADMINISTRATIVE | Facility: HOSPITAL | Age: 62
End: 2022-01-03
Payer: COMMERCIAL

## 2022-01-10 ENCOUNTER — PATIENT MESSAGE (OUTPATIENT)
Dept: ADMINISTRATIVE | Facility: HOSPITAL | Age: 62
End: 2022-01-10
Payer: COMMERCIAL

## 2022-01-26 ENCOUNTER — PATIENT MESSAGE (OUTPATIENT)
Dept: INTERNAL MEDICINE | Facility: CLINIC | Age: 62
End: 2022-01-26
Payer: COMMERCIAL

## 2022-01-30 DIAGNOSIS — E11.69 HYPERLIPIDEMIA ASSOCIATED WITH TYPE 2 DIABETES MELLITUS: ICD-10-CM

## 2022-01-30 DIAGNOSIS — E78.5 HYPERLIPIDEMIA ASSOCIATED WITH TYPE 2 DIABETES MELLITUS: ICD-10-CM

## 2022-01-30 NOTE — TELEPHONE ENCOUNTER
Care Due:                  Date            Visit Type   Department     Provider  --------------------------------------------------------------------------------                                             DESC FAMILY  Last Visit: 12-      [facility]  Galina Lester                                           [facility] PRIMARY  Next Visit: 03-      [facility]           Galina Lester                                                            Last  Test          Frequency    Reason                     Performed    Due Date  --------------------------------------------------------------------------------    HBA1C.......  6 months...  semaglutide..............  06- 12-    Lipid Panel.  12 months..  atorvastatin.............  Not Found    Overdue    Powered by [employer]. Reference number: [id number].   1/30/2022 3:27:37 PM CST

## 2022-01-31 RX ORDER — ATORVASTATIN CALCIUM 40 MG/1
TABLET, FILM COATED ORAL
Qty: 30 TABLET | Refills: 0 | Status: SHIPPED | OUTPATIENT
Start: 2022-01-31 | End: 2022-10-12 | Stop reason: SDUPTHER

## 2022-01-31 RX ORDER — ALPRAZOLAM 0.25 MG/1
TABLET ORAL
Qty: 30 TABLET | Refills: 0 | Status: SHIPPED | OUTPATIENT
Start: 2022-01-31 | End: 2022-03-24

## 2022-03-07 ENCOUNTER — TELEPHONE (OUTPATIENT)
Dept: INTERNAL MEDICINE | Facility: CLINIC | Age: 62
End: 2022-03-07
Payer: COMMERCIAL

## 2022-03-07 NOTE — TELEPHONE ENCOUNTER
----- Message from Katie Chaudhary sent at 3/7/2022 11:49 AM CST -----  Contact: [phone]  Pt has an appt on 03/31 and is asking if she needs labs done for this appt.. please advise and give return call

## 2022-03-07 NOTE — TELEPHONE ENCOUNTER
Tried to contact pt, cell goes straight to , orders for labs have been in since Dec. Sent to [facility]

## 2022-03-23 NOTE — TELEPHONE ENCOUNTER
Care Due:                  Date            Visit Type   Department     Provider  --------------------------------------------------------------------------------                                EP -                              PRIMARY      [facility] FAMILY  Last Visit: 12-      [facility]  Galina Lester                               -                              PRIMARY      [facility] PRIMARY  Next Visit: 03-      [facility]           Galina Lester                                                            Last  Test          Frequency    Reason                     Performed    Due Date  --------------------------------------------------------------------------------    CMP.........  12 months..  atorvastatin,              06- 06-                             lisinopriL-hydrochlorothi                             azide....................    Lipid Panel.  12 months..  atorvastatin.............  12- 12-    Powered by [employer] Reference number: [id number].   3/23/2022 2:07:04 PM CDT

## 2022-03-24 RX ORDER — ALPRAZOLAM 0.25 MG/1
TABLET ORAL
Qty: 30 TABLET | Refills: 5 | Status: SHIPPED | OUTPATIENT
Start: 2022-03-24 | End: 2022-10-04

## 2022-03-30 ENCOUNTER — PATIENT MESSAGE (OUTPATIENT)
Dept: INTERNAL MEDICINE | Facility: CLINIC | Age: 62
End: 2022-03-30
Payer: COMMERCIAL

## 2022-03-30 ENCOUNTER — PATIENT MESSAGE (OUTPATIENT)
Dept: ADMINISTRATIVE | Facility: HOSPITAL | Age: 62
End: 2022-03-30
Payer: COMMERCIAL

## 2022-04-04 ENCOUNTER — OFFICE VISIT (OUTPATIENT)
Dept: INTERNAL MEDICINE | Facility: CLINIC | Age: 62
End: 2022-04-04
Payer: COMMERCIAL

## 2022-04-04 VITALS
WEIGHT: 243.5 LBS | OXYGEN SATURATION: 97 % | DIASTOLIC BLOOD PRESSURE: 84 MMHG | TEMPERATURE: 98 F | SYSTOLIC BLOOD PRESSURE: 128 MMHG | HEART RATE: 69 BPM

## 2022-04-04 DIAGNOSIS — E78.5 HYPERLIPIDEMIA ASSOCIATED WITH TYPE 2 DIABETES MELLITUS: ICD-10-CM

## 2022-04-04 DIAGNOSIS — E11.59 HYPERTENSION ASSOCIATED WITH DIABETES: ICD-10-CM

## 2022-04-04 DIAGNOSIS — I15.2 HYPERTENSION ASSOCIATED WITH DIABETES: ICD-10-CM

## 2022-04-04 DIAGNOSIS — F41.9 ANXIETY: ICD-10-CM

## 2022-04-04 DIAGNOSIS — Z12.31 ENCOUNTER FOR SCREENING MAMMOGRAM FOR MALIGNANT NEOPLASM OF BREAST: ICD-10-CM

## 2022-04-04 DIAGNOSIS — E11.9 TYPE 2 DIABETES MELLITUS WITHOUT COMPLICATION, WITHOUT LONG-TERM CURRENT USE OF INSULIN: ICD-10-CM

## 2022-04-04 DIAGNOSIS — Z00.00 ANNUAL PHYSICAL EXAM: Primary | ICD-10-CM

## 2022-04-04 DIAGNOSIS — E11.69 HYPERLIPIDEMIA ASSOCIATED WITH TYPE 2 DIABETES MELLITUS: ICD-10-CM

## 2022-04-04 PROCEDURE — 99999 PR PBB SHADOW E&M-EST. PATIENT-LVL III: CPT | Mod: PBBFAC,,, | Performed by: INTERNAL MEDICINE

## 2022-04-04 PROCEDURE — 99214 PR OFFICE/OUTPT VISIT, EST, LEVL IV, 30-39 MIN: ICD-10-PCS | Mod: S$GLB,,, | Performed by: INTERNAL MEDICINE

## 2022-04-04 PROCEDURE — 99214 OFFICE O/P EST MOD 30 MIN: CPT | Mod: S$GLB,,, | Performed by: INTERNAL MEDICINE

## 2022-04-04 PROCEDURE — 99999 PR PBB SHADOW E&M-EST. PATIENT-LVL III: ICD-10-PCS | Mod: PBBFAC,,, | Performed by: INTERNAL MEDICINE

## 2022-04-04 RX ORDER — SEMAGLUTIDE 1.34 MG/ML
1 INJECTION, SOLUTION SUBCUTANEOUS
Qty: 3 PEN | Refills: 3 | Status: SHIPPED | OUTPATIENT
Start: 2022-04-04 | End: 2022-10-12 | Stop reason: SDUPTHER

## 2022-04-04 NOTE — PROGRESS NOTES
[facility] Note    Chief Complaint      Chief Complaint   Patient presents with    Follow-up     History of Present Illness       Stephania Rodgers is a 61 y.o. female who presents today for DM2 follow up.  Patient comes to appointment alone.    Problem List Items Addressed This Visit     Hypertension associated with diabetes    Current Assessment & Plan     BP controlled on lisinopril/HCTZ 20/12.5 mg daily, no CP/SOB/HA.           Relevant Medications    semaglutide (OZEMPIC) 1 mg/dose (4 mg/3 mL)    Hyperlipidemia associated with type 2 diabetes mellitus    Current Assessment & Plan     Stable on lipitor 40 mg daily, no myalgias.  The 10-year ASCVD risk score (Juhi KANG JrJosselin, et al., 2013) is: 21.6%    Values used to calculate the score:      Age: 61 years      Sex: Female      Is Non- : Yes      Diabetic: Yes      Tobacco smoker: No      Systolic Blood Pressure: 151 mmHg      Is BP treated: Yes      HDL Cholesterol: 56 mg/dL      Total Cholesterol: 160 mg/dL d             Relevant Medications    semaglutide (OZEMPIC) 1 mg/dose (4 mg/3 mL)    Type 2 diabetes mellitus without complication, without long-term current use of insulin    Current Assessment & Plan     A1C 6.8 in 6/2021.  Changed from metformin to Ozempic last visit.  Has some nausea but nothing she can't handle.  Had GI upset with metformin, did ok on victoza in past.  No hypoglycemia, takes neurontin for neuropathy.           Relevant Medications    semaglutide (OZEMPIC) 1 mg/dose (4 mg/3 mL)    Other Relevant Orders    Hemoglobin A1C    Microalbumin/Creatinine Ratio, Urine    Lipid Panel    Comprehensive Metabolic Panel    CBC Auto Differential    Anxiety    Current Assessment & Plan     Stable on lexapro 5 mg daily.  had stroke 7/21/2020, mom had hand amputated in 12/2020 prior then broke her ankle, patient helps take care of her.  Still working full time.  Has been taking xanax 0.5 mg PRN once daily,  seems to help.               Other Visit Diagnoses     Annual physical exam    -  Primary    Relevant Orders    Hemoglobin A1C    Lipid Panel    Comprehensive Metabolic Panel    CBC Auto Differential    Encounter for screening mammogram for malignant neoplasm of breast        Relevant Orders    Mammo Digital Screening Bilat w/ Tim          Health Maintenance   Topic Date Due    Eye Exam  03/09/2021    Lipid Panel  12/21/2021    Hemoglobin A1c  12/21/2021    Mammogram  05/24/2022    Foot Exam  06/17/2022    Low Dose Statin  01/31/2023    TETANUS VACCINE  05/17/2029    Hepatitis C Screening  Completed       History reviewed. No pertinent past medical history.    Past Surgical History:   Procedure Laterality Date    colonosocpy  colonoscopy    Polyp 2 mm in the descending colon. Repeat 5 years    FOOT SURGERY      MANDIBLE FRACTURE SURGERY         family history includes Diabetes in her brother, father, and mother; Heart disease in her father; Hyperlipidemia in her father and mother; Hypertension in her brother, father, and mother.    Social History     Tobacco Use    Smoking status: Never Smoker    Smokeless tobacco: Never Used   Substance Use Topics    Alcohol use: Yes     Alcohol/week: 3.0 standard drinks     Types: 3 Shots of liquor per week    Drug use: Never       Review of Systems   Constitutional: Negative for chills and fever.   HENT: Negative for sore throat.    Respiratory: Negative for cough and shortness of breath.    Cardiovascular: Negative for chest pain and palpitations.   Gastrointestinal: Negative for constipation, diarrhea, nausea and vomiting.   Genitourinary: Negative for dysuria and hematuria.   Musculoskeletal: Negative for falls.   Neurological: Negative for headaches.        Outpatient Encounter Medications as of 4/4/2022   Medication Sig Dispense Refill    ALPRAZolam (XANAX) 0.25 MG tablet TAKE 1 TABLET BY MOUTH ONCE DAILY AS NEEDED FOR ANXIETY 30 tablet 5    aspirin  (ECOTRIN) 81 MG EC tablet Take 81 mg by mouth once daily.      atorvastatin (LIPITOR) 40 MG tablet TAKE 1 TABLET BY MOUTH ONCE DAILY IN THE EVENING 30 tablet 0    EScitalopram oxalate (LEXAPRO) 5 MG Tab Take 1 tablet (5 mg total) by mouth once daily. 90 tablet 3    gabapentin (NEURONTIN) 300 MG capsule Take 1 capsule (300 mg total) by mouth 2 (two) times daily. 180 capsule 3    lisinopriL-hydrochlorothiazide (PRINZIDE,ZESTORETIC) 20-12.5 mg per tablet Take 1 tablet by mouth once daily. 90 tablet 3    [DISCONTINUED] semaglutide (OZEMPIC) 0.25 mg or 0.5 mg(2 mg/1.5 mL) pen injector Start with 0.25 mg subcutaneously once weekly for 2 weeks, then increase to 0.5 mg weekly. (Patient taking differently: Inject 0.5 mg into the skin every 7 days.) 1 pen 1    semaglutide (OZEMPIC) 1 mg/dose (4 mg/3 mL) Inject 1 mg into the skin every 7 days. 3 pen 3     No facility-administered encounter medications on file as of 4/4/2022.        Review of patient's allergies indicates:  No Known Allergies    Physical Exam      Vital Signs  Temp: 98.2 °F (36.8 °C)  Pulse: 69  SpO2: 97 %  BP: 128/84  Pain Score: 0-No pain  Height and Weight  Weight: 110.5 kg (243 lb 8 oz)]    Physical Exam  Constitutional:       Appearance: She is well-developed.   HENT:      Head: Normocephalic and atraumatic.      Right Ear: External ear normal.      Left Ear: External ear normal.   Eyes:      General:         Right eye: No discharge.         Left eye: No discharge.   Cardiovascular:      Rate and Rhythm: Normal rate and regular rhythm.      Heart sounds: Normal heart sounds. No murmur heard.  Pulmonary:      Effort: Pulmonary effort is normal. No respiratory distress.      Breath sounds: Normal breath sounds.   Abdominal:      General: There is no distension.      Palpations: Abdomen is soft.      Tenderness: There is no abdominal tenderness. There is no guarding.   Musculoskeletal:         General: Normal range of motion.      Cervical back: Normal  range of motion.   Skin:     General: Skin is warm and dry.   Neurological:      Mental Status: She is alert and oriented to person, place, and time.   Psychiatric:         Behavior: Behavior normal.          Laboratory:  CBC:  No results for input(s): WBC, RBC, HGB, HCT, PLT, MCV, MCH, MCHC in the last 2160 hours.  CMP:  No results for input(s): GLU, CALCIUM, ALBUMIN, PROT, NA, K, CO2, CL, BUN, ALKPHOS, ALT, AST, BILITOT in the last 2160 hours.    Invalid input(s): CREATININ  URINALYSIS:  No results for input(s): COLORU, CLARITYU, SPECGRAV, PHUR, PROTEINUA, GLUCOSEU, BILIRUBINCON, BLOODU, WBCU, RBCU, BACTERIA, MUCUS, NITRITE, LEUKOCYTESUR, UROBILINOGEN, HYALINECASTS in the last 2160 hours.   LIPIDS:  No results for input(s): TSH, HDL, CHOL, TRIG, LDLCALC, CHOLHDL, NONHDLCHOL, TOTALCHOLEST in the last 2160 hours.  TSH:  No results for input(s): TSH in the last 2160 hours.  A1C:  No results for input(s): HGBA1C in the last 2160 hours.    Radiology:  No results found in the last 30 days.     Assessment/Plan     Stepahnia Rodgers is a 61 y.o.female with:    1. Type 2 diabetes mellitus without complication, without long-term current use of insulin  - Hemoglobin A1C; Future  - Microalbumin/Creatinine Ratio, Urine; Future  - Lipid Panel; Future  - Comprehensive Metabolic Panel; Future  - CBC Auto Differential; Future  - Hemoglobin A1C  - Microalbumin/Creatinine Ratio, Urine  - Lipid Panel  - Comprehensive Metabolic Panel  - CBC Auto Differential  - semaglutide (OZEMPIC) 1 mg/dose (4 mg/3 mL); Inject 1 mg into the skin every 7 days.  Dispense: 3 pen; Refill: 3    2. Hypertension associated with diabetes    3. Hyperlipidemia associated with type 2 diabetes mellitus    4. Annual physical exam  - Hemoglobin A1C; Future  - Lipid Panel; Future  - Comprehensive Metabolic Panel; Future  - CBC Auto Differential; Future  - Hemoglobin A1C  - Lipid Panel  - Comprehensive Metabolic Panel  - CBC Auto Differential    5. Anxiety    6.  Encounter for screening mammogram for malignant neoplasm of breast  - Mammo Digital Screening Bilat w/ Tim; Future       -labs ASAP  -will schedule with Dr. Neely  - on scheduling pap smear  -Continue current medications and maintain follow up with specialists.    -Follow up in about 6 months (around 10/4/2022) for follow up of medical problems.       Galina Lester MD  [facility]

## 2022-04-04 NOTE — ASSESSMENT & PLAN NOTE
Stable on lexapro 5 mg daily.  had stroke 7/21/2020, mom had hand amputated in 12/2020 prior then broke her ankle, patient helps take care of her.  Still working full time.  Has been taking xanax 0.5 mg PRN once daily, seems to help.

## 2022-04-04 NOTE — ASSESSMENT & PLAN NOTE
A1C 6.8 in 6/2021.  Changed from metformin to Ozempic last visit.  Has some nausea but nothing she can't handle.  Had GI upset with metformin, did ok on victoza in past.  No hypoglycemia, takes neurontin for neuropathy.

## 2022-04-04 NOTE — ASSESSMENT & PLAN NOTE
Stable on lipitor 40 mg daily, no myalgias.  The 10-year ASCVD risk score (Juhi KANG Jr., et al., 2013) is: 21.6%    Values used to calculate the score:      Age: 61 years      Sex: Female      Is Non- : Yes      Diabetic: Yes      Tobacco smoker: No      Systolic Blood Pressure: 151 mmHg      Is BP treated: Yes      HDL Cholesterol: 56 mg/dL      Total Cholesterol: 160 mg/dL d

## 2022-04-06 LAB
ALBUMIN SERPL-MCNC: 4.9 G/DL (ref 3.8–4.8)
ALBUMIN/CREAT UR: 2 MG/G CREAT (ref 0–29)
ALBUMIN/GLOB SERPL: 1.7 {RATIO} (ref 1.2–2.2)
ALP SERPL-CCNC: 120 IU/L (ref 44–121)
ALT SERPL-CCNC: 14 IU/L (ref 0–32)
AST SERPL-CCNC: 21 IU/L (ref 0–40)
BASOPHILS # BLD AUTO: 0 X10E3/UL (ref 0–0.2)
BASOPHILS NFR BLD AUTO: 0 %
BILIRUB SERPL-MCNC: 0.4 MG/DL (ref 0–1.2)
BUN SERPL-MCNC: 15 MG/DL (ref 8–27)
BUN/CREAT SERPL: 17 (ref 12–28)
CALCIUM SERPL-MCNC: 9.9 MG/DL (ref 8.7–10.3)
CHLORIDE SERPL-SCNC: 100 MMOL/L (ref 96–106)
CHOLEST SERPL-MCNC: 155 MG/DL (ref 100–199)
CO2 SERPL-SCNC: 21 MMOL/L (ref 20–29)
CREAT SERPL-MCNC: 0.89 MG/DL (ref 0.57–1)
CREAT UR-MCNC: 163 MG/DL
EOSINOPHIL # BLD AUTO: 0.1 X10E3/UL (ref 0–0.4)
EOSINOPHIL NFR BLD AUTO: 1 %
ERYTHROCYTE [DISTWIDTH] IN BLOOD BY AUTOMATED COUNT: 14.2 % (ref 11.7–15.4)
EST. GFR  (NO RACE VARIABLE): 73 ML/MIN/1.73
GLOBULIN SER CALC-MCNC: 2.9 G/DL (ref 1.5–4.5)
GLUCOSE SERPL-MCNC: 105 MG/DL (ref 65–99)
HBA1C MFR BLD: 7.2 % (ref 4.8–5.6)
HCT VFR BLD AUTO: 44.6 % (ref 34–46.6)
HDLC SERPL-MCNC: 55 MG/DL
HGB BLD-MCNC: 13.8 G/DL (ref 11.1–15.9)
IMM GRANULOCYTES # BLD AUTO: 0 X10E3/UL (ref 0–0.1)
IMM GRANULOCYTES NFR BLD AUTO: 0 %
LDLC SERPL CALC-MCNC: 89 MG/DL (ref 0–99)
LYMPHOCYTES # BLD AUTO: 2 X10E3/UL (ref 0.7–3.1)
LYMPHOCYTES NFR BLD AUTO: 31 %
MCH RBC QN AUTO: 24.8 PG (ref 26.6–33)
MCHC RBC AUTO-ENTMCNC: 30.9 G/DL (ref 31.5–35.7)
MCV RBC AUTO: 80 FL (ref 79–97)
MICROALBUMIN UR-MCNC: 3.9 UG/ML
MONOCYTES # BLD AUTO: 0.6 X10E3/UL (ref 0.1–0.9)
MONOCYTES NFR BLD AUTO: 9 %
NEUTROPHILS # BLD AUTO: 3.9 X10E3/UL (ref 1.4–7)
NEUTROPHILS NFR BLD AUTO: 59 %
PLATELET # BLD AUTO: 365 X10E3/UL (ref 150–450)
POTASSIUM SERPL-SCNC: 4.7 MMOL/L (ref 3.5–5.2)
PROT SERPL-MCNC: 7.8 G/DL (ref 6–8.5)
RBC # BLD AUTO: 5.57 X10E6/UL (ref 3.77–5.28)
SODIUM SERPL-SCNC: 140 MMOL/L (ref 134–144)
TRIGL SERPL-MCNC: 51 MG/DL (ref 0–149)
VLDLC SERPL CALC-MCNC: 11 MG/DL (ref 5–40)
WBC # BLD AUTO: 6.6 X10E3/UL (ref 3.4–10.8)

## 2022-05-23 LAB
LEFT EYE DM RETINOPATHY: NEGATIVE
RIGHT EYE DM RETINOPATHY: NEGATIVE

## 2022-05-30 ENCOUNTER — PATIENT MESSAGE (OUTPATIENT)
Dept: ADMINISTRATIVE | Facility: HOSPITAL | Age: 62
End: 2022-05-30
Payer: COMMERCIAL

## 2022-06-02 ENCOUNTER — HOSPITAL ENCOUNTER (OUTPATIENT)
Facility: HOSPITAL | Age: 62
Discharge: HOME | End: 2022-06-03
Attending: EMERGENCY MEDICINE | Admitting: HOSPITALIST
Payer: COMMERCIAL

## 2022-06-02 DIAGNOSIS — R07.9 CHEST PAIN: ICD-10-CM

## 2022-06-02 DIAGNOSIS — M25.561 RIGHT KNEE PAIN: ICD-10-CM

## 2022-06-02 DIAGNOSIS — M25.522 LEFT ELBOW PAIN: ICD-10-CM

## 2022-06-02 DIAGNOSIS — R55 SYNCOPE: ICD-10-CM

## 2022-06-02 DIAGNOSIS — N17.9 AKI (ACUTE KIDNEY INJURY): Primary | ICD-10-CM

## 2022-06-02 PROBLEM — K08.409 S/P TOOTH EXTRACTION: Status: ACTIVE | Noted: 2022-06-02

## 2022-06-02 PROBLEM — D72.829 LEUKOCYTOSIS: Status: ACTIVE | Noted: 2022-06-02

## 2022-06-02 LAB
ALBUMIN SERPL BCP-MCNC: 4.1 G/DL (ref 3.5–5.2)
ALP SERPL-CCNC: 125 U/L (ref 55–135)
ALT SERPL W/O P-5'-P-CCNC: 24 U/L (ref 10–44)
ANION GAP SERPL CALC-SCNC: 13 MMOL/L (ref 8–16)
AST SERPL-CCNC: 28 U/L (ref 10–40)
BACTERIA #/AREA URNS AUTO: ABNORMAL /HPF
BASOPHILS # BLD AUTO: 0.02 K/UL (ref 0–0.2)
BASOPHILS NFR BLD: 0.1 % (ref 0–1.9)
BILIRUB SERPL-MCNC: 0.7 MG/DL (ref 0.1–1)
BILIRUB UR QL STRIP: NEGATIVE
BUN SERPL-MCNC: 18 MG/DL (ref 8–23)
CALCIUM SERPL-MCNC: 10.3 MG/DL (ref 8.7–10.5)
CHLORIDE SERPL-SCNC: 96 MMOL/L (ref 95–110)
CLARITY UR REFRACT.AUTO: ABNORMAL
CO2 SERPL-SCNC: 24 MMOL/L (ref 23–29)
COLOR UR AUTO: ABNORMAL
CREAT SERPL-MCNC: 1.5 MG/DL (ref 0.5–1.4)
CTP QC/QA: YES
DIFFERENTIAL METHOD: ABNORMAL
EOSINOPHIL # BLD AUTO: 0 K/UL (ref 0–0.5)
EOSINOPHIL NFR BLD: 0 % (ref 0–8)
ERYTHROCYTE [DISTWIDTH] IN BLOOD BY AUTOMATED COUNT: 14.5 % (ref 11.5–14.5)
EST. GFR  (AFRICAN AMERICAN): 42.7 ML/MIN/1.73 M^2
EST. GFR  (NON AFRICAN AMERICAN): 37.1 ML/MIN/1.73 M^2
GLUCOSE SERPL-MCNC: 112 MG/DL (ref 70–110)
GLUCOSE UR QL STRIP: NEGATIVE
HCT VFR BLD AUTO: 42.6 % (ref 37–48.5)
HGB BLD-MCNC: 13.7 G/DL (ref 12–16)
HGB UR QL STRIP: NEGATIVE
HYALINE CASTS UR QL AUTO: 14 /LPF
IMM GRANULOCYTES # BLD AUTO: 0.11 K/UL (ref 0–0.04)
IMM GRANULOCYTES NFR BLD AUTO: 0.6 % (ref 0–0.5)
KETONES UR QL STRIP: NEGATIVE
LEUKOCYTE ESTERASE UR QL STRIP: NEGATIVE
LYMPHOCYTES # BLD AUTO: 1.5 K/UL (ref 1–4.8)
LYMPHOCYTES NFR BLD: 8.1 % (ref 18–48)
MAGNESIUM SERPL-MCNC: 1.9 MG/DL (ref 1.6–2.6)
MCH RBC QN AUTO: 25 PG (ref 27–31)
MCHC RBC AUTO-ENTMCNC: 32.2 G/DL (ref 32–36)
MCV RBC AUTO: 78 FL (ref 82–98)
MICROSCOPIC COMMENT: ABNORMAL
MONOCYTES # BLD AUTO: 0.9 K/UL (ref 0.3–1)
MONOCYTES NFR BLD: 4.9 % (ref 4–15)
NEUTROPHILS # BLD AUTO: 15.5 K/UL (ref 1.8–7.7)
NEUTROPHILS NFR BLD: 86.3 % (ref 38–73)
NITRITE UR QL STRIP: NEGATIVE
NRBC BLD-RTO: 0 /100 WBC
PH UR STRIP: 5 [PH] (ref 5–8)
PHOSPHATE SERPL-MCNC: 4.5 MG/DL (ref 2.7–4.5)
PLATELET # BLD AUTO: 396 K/UL (ref 150–450)
PMV BLD AUTO: 9.5 FL (ref 9.2–12.9)
POTASSIUM SERPL-SCNC: 4 MMOL/L (ref 3.5–5.1)
PROT SERPL-MCNC: 8.2 G/DL (ref 6–8.4)
PROT UR QL STRIP: ABNORMAL
RBC # BLD AUTO: 5.48 M/UL (ref 4–5.4)
RBC #/AREA URNS AUTO: 3 /HPF (ref 0–4)
SARS-COV-2 RDRP RESP QL NAA+PROBE: NEGATIVE
SODIUM SERPL-SCNC: 133 MMOL/L (ref 136–145)
SP GR UR STRIP: 1.01 (ref 1–1.03)
SQUAMOUS #/AREA URNS AUTO: 7 /HPF
TROPONIN I SERPL DL<=0.01 NG/ML-MCNC: <0.006 NG/ML (ref 0–0.03)
URN SPEC COLLECT METH UR: ABNORMAL
WBC # BLD AUTO: 17.95 K/UL (ref 3.9–12.7)
WBC #/AREA URNS AUTO: 15 /HPF (ref 0–5)

## 2022-06-02 PROCEDURE — 83735 ASSAY OF MAGNESIUM: CPT | Performed by: EMERGENCY MEDICINE

## 2022-06-02 PROCEDURE — 96360 HYDRATION IV INFUSION INIT: CPT

## 2022-06-02 PROCEDURE — G0378 HOSPITAL OBSERVATION PER HR: HCPCS

## 2022-06-02 PROCEDURE — 81001 URINALYSIS AUTO W/SCOPE: CPT | Performed by: EMERGENCY MEDICINE

## 2022-06-02 PROCEDURE — 84100 ASSAY OF PHOSPHORUS: CPT | Performed by: EMERGENCY MEDICINE

## 2022-06-02 PROCEDURE — 99220 PR INITIAL OBSERVATION CARE,LEVL III: CPT | Mod: ,,, | Performed by: PHYSICIAN ASSISTANT

## 2022-06-02 PROCEDURE — 84484 ASSAY OF TROPONIN QUANT: CPT | Performed by: EMERGENCY MEDICINE

## 2022-06-02 PROCEDURE — 63600175 PHARM REV CODE 636 W HCPCS: Performed by: EMERGENCY MEDICINE

## 2022-06-02 PROCEDURE — 99285 PR EMERGENCY DEPT VISIT,LEVEL V: ICD-10-PCS | Mod: CS,,, | Performed by: EMERGENCY MEDICINE

## 2022-06-02 PROCEDURE — 93010 ELECTROCARDIOGRAM REPORT: CPT | Mod: ,,, | Performed by: INTERNAL MEDICINE

## 2022-06-02 PROCEDURE — 63600175 PHARM REV CODE 636 W HCPCS: Performed by: PHYSICIAN ASSISTANT

## 2022-06-02 PROCEDURE — 96372 THER/PROPH/DIAG INJ SC/IM: CPT | Mod: 59 | Performed by: PHYSICIAN ASSISTANT

## 2022-06-02 PROCEDURE — 99285 EMERGENCY DEPT VISIT HI MDM: CPT | Mod: 25

## 2022-06-02 PROCEDURE — 25000003 PHARM REV CODE 250: Performed by: EMERGENCY MEDICINE

## 2022-06-02 PROCEDURE — 87186 SC STD MICRODIL/AGAR DIL: CPT | Performed by: EMERGENCY MEDICINE

## 2022-06-02 PROCEDURE — 99285 EMERGENCY DEPT VISIT HI MDM: CPT | Mod: CS,,, | Performed by: EMERGENCY MEDICINE

## 2022-06-02 PROCEDURE — 93005 ELECTROCARDIOGRAM TRACING: CPT

## 2022-06-02 PROCEDURE — 93010 EKG 12-LEAD: ICD-10-PCS | Mod: ,,, | Performed by: INTERNAL MEDICINE

## 2022-06-02 PROCEDURE — 87086 URINE CULTURE/COLONY COUNT: CPT | Performed by: EMERGENCY MEDICINE

## 2022-06-02 PROCEDURE — 85025 COMPLETE CBC W/AUTO DIFF WBC: CPT | Performed by: EMERGENCY MEDICINE

## 2022-06-02 PROCEDURE — 87077 CULTURE AEROBIC IDENTIFY: CPT | Performed by: EMERGENCY MEDICINE

## 2022-06-02 PROCEDURE — U0002 COVID-19 LAB TEST NON-CDC: HCPCS | Performed by: EMERGENCY MEDICINE

## 2022-06-02 PROCEDURE — 80053 COMPREHEN METABOLIC PANEL: CPT | Performed by: EMERGENCY MEDICINE

## 2022-06-02 PROCEDURE — 99220 PR INITIAL OBSERVATION CARE,LEVL III: ICD-10-PCS | Mod: ,,, | Performed by: PHYSICIAN ASSISTANT

## 2022-06-02 PROCEDURE — 87088 URINE BACTERIA CULTURE: CPT | Performed by: EMERGENCY MEDICINE

## 2022-06-02 RX ORDER — ASPIRIN 81 MG/1
81 TABLET ORAL DAILY
Status: DISCONTINUED | OUTPATIENT
Start: 2022-06-03 | End: 2022-06-03 | Stop reason: HOSPADM

## 2022-06-02 RX ORDER — ESCITALOPRAM OXALATE 5 MG/1
5 TABLET ORAL DAILY
Status: DISCONTINUED | OUTPATIENT
Start: 2022-06-03 | End: 2022-06-03 | Stop reason: HOSPADM

## 2022-06-02 RX ORDER — IPRATROPIUM BROMIDE AND ALBUTEROL SULFATE 2.5; .5 MG/3ML; MG/3ML
3 SOLUTION RESPIRATORY (INHALATION) EVERY 4 HOURS PRN
Status: DISCONTINUED | OUTPATIENT
Start: 2022-06-02 | End: 2022-06-03 | Stop reason: HOSPADM

## 2022-06-02 RX ORDER — INSULIN ASPART 100 [IU]/ML
0-5 INJECTION, SOLUTION INTRAVENOUS; SUBCUTANEOUS
Status: DISCONTINUED | OUTPATIENT
Start: 2022-06-02 | End: 2022-06-03 | Stop reason: HOSPADM

## 2022-06-02 RX ORDER — ONDANSETRON 8 MG/1
8 TABLET, ORALLY DISINTEGRATING ORAL EVERY 8 HOURS PRN
Status: DISCONTINUED | OUTPATIENT
Start: 2022-06-02 | End: 2022-06-03 | Stop reason: HOSPADM

## 2022-06-02 RX ORDER — GABAPENTIN 300 MG/1
300 CAPSULE ORAL 2 TIMES DAILY
Status: DISCONTINUED | OUTPATIENT
Start: 2022-06-03 | End: 2022-06-03 | Stop reason: HOSPADM

## 2022-06-02 RX ORDER — GLUCAGON 1 MG
1 KIT INJECTION
Status: DISCONTINUED | OUTPATIENT
Start: 2022-06-02 | End: 2022-06-03 | Stop reason: HOSPADM

## 2022-06-02 RX ORDER — HYDROCODONE BITARTRATE AND ACETAMINOPHEN 5; 325 MG/1; MG/1
1 TABLET ORAL EVERY 8 HOURS PRN
Status: DISCONTINUED | OUTPATIENT
Start: 2022-06-03 | End: 2022-06-03 | Stop reason: HOSPADM

## 2022-06-02 RX ORDER — POLYETHYLENE GLYCOL 3350 17 G/17G
17 POWDER, FOR SOLUTION ORAL DAILY PRN
Status: DISCONTINUED | OUTPATIENT
Start: 2022-06-02 | End: 2022-06-03 | Stop reason: HOSPADM

## 2022-06-02 RX ORDER — BISACODYL 10 MG
10 SUPPOSITORY, RECTAL RECTAL DAILY PRN
Status: DISCONTINUED | OUTPATIENT
Start: 2022-06-02 | End: 2022-06-03 | Stop reason: HOSPADM

## 2022-06-02 RX ORDER — IBUPROFEN 200 MG
24 TABLET ORAL
Status: DISCONTINUED | OUTPATIENT
Start: 2022-06-02 | End: 2022-06-03 | Stop reason: HOSPADM

## 2022-06-02 RX ORDER — ACETAMINOPHEN 325 MG/1
650 TABLET ORAL EVERY 4 HOURS PRN
Status: DISCONTINUED | OUTPATIENT
Start: 2022-06-02 | End: 2022-06-03 | Stop reason: HOSPADM

## 2022-06-02 RX ORDER — IBUPROFEN 200 MG
16 TABLET ORAL
Status: DISCONTINUED | OUTPATIENT
Start: 2022-06-02 | End: 2022-06-03 | Stop reason: HOSPADM

## 2022-06-02 RX ORDER — ALPRAZOLAM 0.25 MG/1
0.25 TABLET ORAL DAILY PRN
Status: DISCONTINUED | OUTPATIENT
Start: 2022-06-03 | End: 2022-06-03 | Stop reason: HOSPADM

## 2022-06-02 RX ORDER — ACETAMINOPHEN 325 MG/1
650 TABLET ORAL
Status: COMPLETED | OUTPATIENT
Start: 2022-06-02 | End: 2022-06-02

## 2022-06-02 RX ORDER — ATORVASTATIN CALCIUM 40 MG/1
40 TABLET, FILM COATED ORAL NIGHTLY
Status: DISCONTINUED | OUTPATIENT
Start: 2022-06-03 | End: 2022-06-03 | Stop reason: HOSPADM

## 2022-06-02 RX ORDER — PROMETHAZINE HYDROCHLORIDE 25 MG/1
25 TABLET ORAL EVERY 6 HOURS PRN
Status: DISCONTINUED | OUTPATIENT
Start: 2022-06-02 | End: 2022-06-03 | Stop reason: HOSPADM

## 2022-06-02 RX ORDER — AMOXICILLIN 250 MG/1
250 CAPSULE ORAL EVERY 8 HOURS
Status: DISCONTINUED | OUTPATIENT
Start: 2022-06-03 | End: 2022-06-03 | Stop reason: HOSPADM

## 2022-06-02 RX ORDER — TALC
6 POWDER (GRAM) TOPICAL NIGHTLY PRN
Status: DISCONTINUED | OUTPATIENT
Start: 2022-06-02 | End: 2022-06-03 | Stop reason: HOSPADM

## 2022-06-02 RX ORDER — ENOXAPARIN SODIUM 100 MG/ML
40 INJECTION SUBCUTANEOUS EVERY 24 HOURS
Status: DISCONTINUED | OUTPATIENT
Start: 2022-06-02 | End: 2022-06-03 | Stop reason: HOSPADM

## 2022-06-02 RX ADMIN — ENOXAPARIN SODIUM 40 MG: 100 INJECTION SUBCUTANEOUS at 11:06

## 2022-06-02 RX ADMIN — SODIUM CHLORIDE, SODIUM LACTATE, POTASSIUM CHLORIDE, AND CALCIUM CHLORIDE 1000 ML: .6; .31; .03; .02 INJECTION, SOLUTION INTRAVENOUS at 11:06

## 2022-06-02 RX ADMIN — SODIUM CHLORIDE, SODIUM LACTATE, POTASSIUM CHLORIDE, AND CALCIUM CHLORIDE 1000 ML: .6; .31; .03; .02 INJECTION, SOLUTION INTRAVENOUS at 06:06

## 2022-06-02 RX ADMIN — ACETAMINOPHEN 650 MG: 325 TABLET ORAL at 07:06

## 2022-06-02 NOTE — ED PROVIDER NOTES
"Encounter Date: 6/2/2022    SCRIBE #1 NOTE: I, Aamir Oseguera, am scribing for, and in the presence of,  Dung Garcia MD. I have scribed the following portions of the note - the EKG reading. Other sections scribed: HPI, ROS.   SCRIBE #2 NOTE: I, Varsha Figueroa, am scribing for, and in the presence of,  Dung Garcia MD. I have scribed the following portions of the note - the EKG reading. Other sections scribed: HPI, ROS.     History     Chief Complaint   Patient presents with    Fall     Fell this morning. Right knee and left arm pain     Time patient was seen by the provider: 5:29 PM      The patient is a 61 y.o. female with co-morbidities including: hypertension, anxiety, HLD, and non-insulin dependent diabetes who presents to the ED with a complaint of loss of consciousness and a fall early this morning. Patient is taking  Amoxicillin and Hydrocodone after getting multiple tooth extractions yesterday. The last hydrocodone she took was yesterday. She got up to use the bathroom and was experiencing nausea, vomiting, and diarrhea. After leaving the bathroom she lost consciousness, falling forward hitting her knees, left elbow, and face. Patient has been experiencing "heartburn", left facial pains, left elbow pain that worsens with range of motion, right knee pains that worsens with weight bearing. She denies any vision change, shortness of breath, chest pains, abdominal pains, leg swelling, or visual disturbances . Curreltly she is not experiencing any nausea.     The history is provided by the patient and medical records. No  was used.     Review of patient's allergies indicates:  No Known Allergies  Past Medical History:   Diagnosis Date    Hypertension      Past Surgical History:   Procedure Laterality Date    colonosocpy  colonoscopy    Polyp 2 mm in the descending colon. Repeat 5 years    FOOT SURGERY      MANDIBLE FRACTURE SURGERY       Family History   Problem Relation " Age of Onset    Hypertension Mother     Hyperlipidemia Mother     Diabetes Mother     Diabetes Father     Hyperlipidemia Father     Hypertension Father     Heart disease Father     Diabetes Brother     Hypertension Brother      Social History     Tobacco Use    Smoking status: Never Smoker    Smokeless tobacco: Never Used   Substance Use Topics    Alcohol use: Yes     Alcohol/week: 3.0 standard drinks     Types: 3 Shots of liquor per week    Drug use: Never     Review of Systems   Constitutional: Negative for fever.   HENT: Negative for sore throat.         + Left facial pain   Eyes: Negative for visual disturbance.   Respiratory: Negative for shortness of breath.    Cardiovascular: Negative for chest pain and leg swelling.   Gastrointestinal: Positive for diarrhea, nausea and vomiting. Negative for abdominal pain.   Genitourinary: Negative for dysuria.   Musculoskeletal: Positive for arthralgias (left elbow, right knee ). Negative for back pain.   Skin: Negative for rash.   Neurological: Negative for weakness.   Hematological: Does not bruise/bleed easily.       Physical Exam     Initial Vitals [06/02/22 1625]   BP Pulse Resp Temp SpO2   (!) 175/77 83 18 98.7 °F (37.1 °C) 97 %      MAP       --         Physical Exam    Vitals reviewed.  Constitutional:   61-year-old  woman, mild discomfort noted   HENT:   Head: Normocephalic.   There is minimal swelling of the left forehead and periorbital area with mild overlying tenderness without bony step-off or deformity; the patient is edentulous without evidence of additional intraoral injury   Eyes: EOM are normal. Pupils are equal, round, and reactive to light.   Patient denies pain with extraocular movements   Neck: No tracheal deviation present.   Cardiovascular: Normal rate, regular rhythm and intact distal pulses.   Pulmonary/Chest: Breath sounds normal. No stridor. No respiratory distress.   Abdominal: Abdomen is soft. She exhibits no  mass. There is no abdominal tenderness. There is no rebound.   Musculoskeletal:      Comments: Right knee:  There is mild palpation tenderness and painful range of motion with mild anterior peripatellar swelling without associated deformity  Left elbow:  There is mildly diminished range of motion with pain at extremes without external evidence of trauma or deformity     Neurological: She is alert and oriented to person, place, and time.   Skin: Skin is warm and dry.   Psychiatric: She has a normal mood and affect. Thought content normal.         ED Course   Procedures  Labs Reviewed   CBC W/ AUTO DIFFERENTIAL - Abnormal; Notable for the following components:       Result Value    WBC 17.95 (*)     RBC 5.48 (*)     MCV 78 (*)     MCH 25.0 (*)     Immature Granulocytes 0.6 (*)     Gran # (ANC) 15.5 (*)     Immature Grans (Abs) 0.11 (*)     Gran % 86.3 (*)     Lymph % 8.1 (*)     All other components within normal limits   COMPREHENSIVE METABOLIC PANEL - Abnormal; Notable for the following components:    Sodium 133 (*)     Glucose 112 (*)     Creatinine 1.5 (*)     eGFR if  42.7 (*)     eGFR if non  37.1 (*)     All other components within normal limits   URINALYSIS, REFLEX TO URINE CULTURE - Abnormal; Notable for the following components:    Appearance, UA Cloudy (*)     Protein, UA 1+ (*)     All other components within normal limits    Narrative:     Specimen Source->Urine   URINALYSIS MICROSCOPIC - Abnormal; Notable for the following components:    WBC, UA 15 (*)     Hyaline Casts, UA 14 (*)     All other components within normal limits    Narrative:     Specimen Source->Urine   CULTURE, URINE   CULTURE, BLOOD   CULTURE, BLOOD   TROPONIN I   MAGNESIUM   PHOSPHORUS   SARS-COV-2 RDRP GENE    Narrative:     This test utilizes isothermal nucleic acid amplification   technology to detect the SARS-CoV-2 RdRp nucleic acid segment.   The analytical sensitivity (limit of detection) is 125  genome   equivalents/mL.   A POSITIVE result implies infection with the SARS-CoV-2 virus;   the patient is presumed to be contagious.     A NEGATIVE result means that SARS-CoV-2 nucleic acids are not   present above the limit of detection. A NEGATIVE result should be   treated as presumptive. It does not rule out the possibility of   COVID-19 and should not be the sole basis for treatment decisions.   If COVID-19 is strongly suspected based on clinical and exposure   history, re-testing using an alternate molecular assay should be   considered.   This test is only for use under the Food and Drug   Administration s Emergency Use Authorization (EUA).   Commercial kits are provided by [employer].   Performance characteristics of the EUA have been independently   verified by [facility] Department of   Pathology and Laboratory Medicine.   _________________________________________________________________   The authorized Fact Sheet for Healthcare Providers and the authorized Fact   Sheet for Patients of the ID NOW COVID-19 are available on the FDA   website:     https://www.fda.gov/media/479434/download  https://www.fda.gov/media/649238/download            EKG Readings: (Independently Interpreted)   Initial Reading: No STEMI. Rhythm: Normal Sinus Rhythm. Heart Rate: 82. T Waves Flipped: I. Axis: Normal.   None specific ST segment flattening noted to the lateral leads        Imaging Results          X-Ray Chest PA And Lateral (Final result)  Result time 06/02/22 20:07:35    Final result by Tobin Espinoza MD (06/02/22 20:07:35)                 Impression:      No radiographic acute intrathoracic process seen.      Electronically signed by: Tobin Espinoza MD  Date:    06/02/2022  Time:    20:07             Narrative:    EXAMINATION:  XR CHEST PA AND LATERAL    CLINICAL HISTORY:  Syncope and collapse    TECHNIQUE:  PA and lateral views of the chest were performed.    COMPARISON:  None    FINDINGS:  Patient  is slightly rotated.  Resolution is limited by body habitus with underpenetration.    Trachea is midline and patent.  Nonspecific elevation of the right hemidiaphragm.The lungs are well expanded without consolidation, pleural effusion or pneumothorax.    The cardiac silhouette is normal in size. Mediastinal structures are midline.  There is calcification and slight tortuosity of the aorta.  Pulmonary vasculature and hilar contours are within normal limits.    Osseous structures show minimal degenerative change without acute process seen.                               X-Ray Knee 3 View Right (Final result)  Result time 06/02/22 18:53:07    Final result by Tobin Espinoza MD (06/02/22 18:53:07)                 Impression:      Anterior right knee suspected localized soft tissue swelling/contusion, without acute displaced fracture-dislocation identified.      Electronically signed by: Tobin Espinoza MD  Date:    06/02/2022  Time:    18:53             Narrative:    EXAMINATION:  XR KNEE 3 VIEW RIGHT    CLINICAL HISTORY:  Pain in right knee    TECHNIQUE:  AP, lateral, and Merchant views of the right knee were performed.    COMPARISON:  None    FINDINGS:  Bones are well mineralized. Overall alignment is within normal limits.  No displaced fracture, dislocation or destructive osseous process.  No large suprapatellar joint effusion.  Mild tricompartmental degenerative change.  Localized soft tissue prominence at the anterior infrapatellar region likely representing soft tissue swelling/contusion.  No subcutaneous emphysema or radiodense retained foreign body.                               X-Ray Elbow Complete Left (Final result)  Result time 06/02/22 18:51:40    Final result by Tobin Espinoza MD (06/02/22 18:51:40)                 Impression:      No acute displaced fracture-dislocation identified.      Electronically signed by: Tobin Espinoza MD  Date:    06/02/2022  Time:    18:51             Narrative:    EXAMINATION:  XR  ELBOW COMPLETE 3 VIEW LEFT    CLINICAL HISTORY:  Pain in left elbow    TECHNIQUE:  AP, lateral, and oblique views of the left elbow were performed.    COMPARISON:  None    FINDINGS:  Bones are well mineralized. Overall alignment is within normal limits.  No displaced fracture, dislocation or destructive osseous process.  No large elbow joint effusion.  Joint spaces appear relatively maintained. No subcutaneous emphysema or radiodense retained foreign body.                               CT Maxillofacial Without Contrast (Final result)  Result time 06/02/22 19:01:44    Final result by Tobin Espinoza MD (06/02/22 19:01:44)                 Impression:      No evidence of an acute displaced fracture.    Electronically signed by resident: Albert Altman MD  Date:    06/02/2022  Time:    18:44    Electronically signed by: Tobin Espinoza MD  Date:    06/02/2022  Time:    19:01             Narrative:    EXAMINATION:  CT MAXILLOFACIAL WITHOUT CONTRAST    CLINICAL HISTORY:  Facial trauma, blunt;    TECHNIQUE:  Low dose CT images throughout the region of the facial bones.  Axial, sagittal and coronal reformations were obtained.  Contrast was not administered.    COMPARISON:  None    FINDINGS:  No focal soft tissue swelling identified.  The globes and intraorbital contents are within normal limits.  No orbital fracture.    The remainder of the facial bones appear intact without evidence of an acute displaced fracture.  No osseous destructive lesions.  2-3 mm metallic density right maxilla.    Temporomandibular joints appropriately position without evidence of dislocation.  Patient is edentulous.    Mild patchy paranasal sinus mucosal thickening. Mastoids are clear.    Limited intracranial evaluation is unremarkable.                                 Medications   enoxaparin injection 40 mg (40 mg Subcutaneous Given 6/2/22 3377)   albuterol-ipratropium 2.5 mg-0.5 mg/3 mL nebulizer solution 3 mL (has no administration in time range)    melatonin tablet 6 mg (has no administration in time range)   ondansetron disintegrating tablet 8 mg (has no administration in time range)   promethazine tablet 25 mg (has no administration in time range)   polyethylene glycol packet 17 g (has no administration in time range)   bisacodyL suppository 10 mg (has no administration in time range)   acetaminophen tablet 650 mg (has no administration in time range)   glucose chewable tablet 16 g (has no administration in time range)   glucose chewable tablet 24 g (has no administration in time range)   glucagon (human recombinant) injection 1 mg (has no administration in time range)   dextrose 10% bolus 125 mL (has no administration in time range)   dextrose 10% bolus 250 mL (has no administration in time range)   insulin aspart U-100 pen 0-5 Units (has no administration in time range)   ALPRAZolam tablet 0.25 mg (has no administration in time range)   amoxicillin capsule 250 mg (has no administration in time range)   aspirin EC tablet 81 mg (has no administration in time range)   atorvastatin tablet 40 mg (has no administration in time range)   EScitalopram oxalate tablet 5 mg (has no administration in time range)   gabapentin capsule 300 mg (has no administration in time range)   HYDROcodone-acetaminophen 5-325 mg per tablet 1 tablet (has no administration in time range)   lactated ringers bolus 1,000 mL (0 mLs Intravenous Stopped 6/2/22 1906)   acetaminophen tablet 650 mg (650 mg Oral Given 6/2/22 1934)   lactated ringers bolus 1,000 mL (1,000 mLs Intravenous New Bag 6/2/22 2344)     Medical Decision Making:   History:   Old Medical Records: I decided to obtain old medical records.  Differential Diagnosis:   Electrolyte derangement, facial fracture, syncope, lethal arrhythmia, elbow fracture, radial head injury, knee contusion, patellar fracture  Independently Interpreted Test(s):   I have ordered and independently interpreted EKG Reading(s) - see prior  notes  Clinical Tests:   Lab Tests: Ordered and Reviewed  Radiological Study: Ordered and Reviewed  Medical Tests: Ordered and Reviewed          Scribe Attestation:   Scribe #1: I performed the above scribed service and the documentation accurately describes the services I performed. I attest to the accuracy of the note.  Scribe #2: I performed the above scribed service and the documentation accurately describes the services I performed. I attest to the accuracy of the note.    Attending Attestation:             Attending ED Notes:   Emergency department evaluation today reveals a moderate leukocytosis at 17.9 WBC within associated granulocytosis and adequate platelets.  Metabolic profile reveals mild hyponatremia, minimal hyperglycemia, however, there is evidence of acute kidney injury with a near doubling of her baseline creatinine noted on today's labs in contrast to recent previous.  EKG does not reveal evidence of maximino injury pattern, and the serum troponin is notably within normal limits today.  Urinalysis reveals minimal proteinuria and mild leukocytosis without evidence of nitrite.  Radiographs obtained today do not reveal evidence of maximino fracture or dislocation, though there is evidence of soft tissue injury of the right knee in this patient presenting with a fall and syncopal episodes following nausea vomiting and diarrhea.  In light of the history of syncope of unknown etiology as well as lab abnormalities of leukocytosis and acute kidney injury noted today, I have advocated for further evaluation and management of this patient and have discussed with hospital medicine.  She will be placed in observation under their care in fair condition for further therapy and management.               Clinical Impression:   Final diagnoses:  [R55] Syncope  [M25.522] Left elbow pain  [M25.561] Right knee pain          ED Disposition Condition    Observation               Dung Garcia MD  06/04/22 5511

## 2022-06-02 NOTE — ED TRIAGE NOTES
"Stephania Rodgers, a 61 y.o. female presents to the ED w/ complaint of fall. Pt reports two falls this morning. Pt reports "I think I lost consciousness for a few seconds." Pt reports she hit her forehead on the ground. Pt reports she had two episodes of diarrhea this morning and when she got up to use the restroom the second time, she felt very dizzy and fell from standing. Pt reports the second fall occurred while walking to her car. Pt reports she had oral surgery and take hydrocodone and amoxicillin. Reports pain to bilateral knees, left arm, and left forehead. Denies chest pain/SOB. Denies n/v.    Triage note:  Chief Complaint   Patient presents with    Fall     Fell this morning. Right knee and left arm pain     Review of patient's allergies indicates:  No Known Allergies  Past Medical History:   Diagnosis Date    Hypertension      Patient identifiers verified and correct for Stephania Rodgers    LOC: The patient is awake, alert, and aware of environment. The patient is AOX4 and speaking appropriately.   APPEARANCE: No acute distress noted.   HEENT: WDL, PERRLA  PSYCHOSOCIAL: Patient is calm and cooperative. Denies SI/HI.  SKIN: The skin is warm, dry, color consistent with ethnicity. No breakdown or brusing visible.  RESPIRATORY: Airway is open and patent. Bilateral chest rise and fall. Respiratory rate even and unlabored.  No accessory muscle use noted.  CARDIAC: Patient has a normal rate and rhythm. No complaints of chest pain.  ABDOMEN/GI: Soft, non tender. No distention noted. Denies n/v. Reports two episodes of diarrhea this morning.  URINARY:  Voids independently without difficulty. No complaints of frequency, urgency, burning, or blood in urine.   NEUROLOGIC: Eyes open spontaneously. Speech clear.  Able to follow commands, demonstrating ability to actively and appropriately communicate within context of current conversation. Symmetrical facial muscles. Movement is purposeful. Denies " dizziness/lightheadedness.  MUSCULOSKELETAL: No obvious deformities noted. Full ROM in all extremities.  PERIPHERAL VASCULAR: Cap refill <3 secs bilaterally. No peripheral edema noted. Denies numbness and tingling in extremities.

## 2022-06-03 ENCOUNTER — TELEPHONE (OUTPATIENT)
Dept: INTERNAL MEDICINE | Facility: CLINIC | Age: 62
End: 2022-06-03
Payer: COMMERCIAL

## 2022-06-03 VITALS
OXYGEN SATURATION: 93 % | HEART RATE: 82 BPM | TEMPERATURE: 99 F | SYSTOLIC BLOOD PRESSURE: 128 MMHG | HEIGHT: 67 IN | BODY MASS INDEX: 37.04 KG/M2 | RESPIRATION RATE: 16 BRPM | DIASTOLIC BLOOD PRESSURE: 70 MMHG | WEIGHT: 236 LBS

## 2022-06-03 LAB
ANION GAP SERPL CALC-SCNC: 12 MMOL/L (ref 8–16)
ASCENDING AORTA: 3.49 CM
AV INDEX (PROSTH): 0.94
AV MEAN GRADIENT: 7 MMHG
AV PEAK GRADIENT: 12 MMHG
AV VALVE AREA: 3.12 CM2
AV VELOCITY RATIO: 0.85
BASOPHILS # BLD AUTO: 0.01 K/UL (ref 0–0.2)
BASOPHILS NFR BLD: 0.1 % (ref 0–1.9)
BSA FOR ECHO PROCEDURE: 2.25 M2
BUN SERPL-MCNC: 16 MG/DL (ref 8–23)
CALCIUM SERPL-MCNC: 9.7 MG/DL (ref 8.7–10.5)
CHLORIDE SERPL-SCNC: 98 MMOL/L (ref 95–110)
CO2 SERPL-SCNC: 26 MMOL/L (ref 23–29)
CREAT SERPL-MCNC: 1.2 MG/DL (ref 0.5–1.4)
CV ECHO LV RWT: 0.41 CM
DIFFERENTIAL METHOD: ABNORMAL
DOP CALC AO PEAK VEL: 1.76 M/S
DOP CALC AO VTI: 33.09 CM
DOP CALC LVOT AREA: 3.3 CM2
DOP CALC LVOT DIAMETER: 2.06 CM
DOP CALC LVOT PEAK VEL: 1.5 M/S
DOP CALC LVOT STROKE VOLUME: 103.27 CM3
DOP CALCLVOT PEAK VEL VTI: 31 CM
E WAVE DECELERATION TIME: 179.94 MSEC
E/A RATIO: 1.16
E/E' RATIO: 9.05 M/S
ECHO LV POSTERIOR WALL: 1.01 CM (ref 0.6–1.1)
EJECTION FRACTION: 68 %
EOSINOPHIL # BLD AUTO: 0 K/UL (ref 0–0.5)
EOSINOPHIL NFR BLD: 0.2 % (ref 0–8)
ERYTHROCYTE [DISTWIDTH] IN BLOOD BY AUTOMATED COUNT: 14.6 % (ref 11.5–14.5)
EST. GFR  (AFRICAN AMERICAN): 56 ML/MIN/1.73 M^2
EST. GFR  (NON AFRICAN AMERICAN): 48.6 ML/MIN/1.73 M^2
FRACTIONAL SHORTENING: 35 % (ref 28–44)
GLUCOSE SERPL-MCNC: 94 MG/DL (ref 70–110)
HCT VFR BLD AUTO: 38.3 % (ref 37–48.5)
HGB BLD-MCNC: 12.4 G/DL (ref 12–16)
IMM GRANULOCYTES # BLD AUTO: 0.04 K/UL (ref 0–0.04)
IMM GRANULOCYTES NFR BLD AUTO: 0.3 % (ref 0–0.5)
INTERVENTRICULAR SEPTUM: 1.15 CM (ref 0.6–1.1)
LA MAJOR: 5.03 CM
LA MINOR: 5.34 CM
LA WIDTH: 3.65 CM
LEFT ATRIUM SIZE: 3.16 CM
LEFT ATRIUM VOLUME INDEX MOD: 20.3 ML/M2
LEFT ATRIUM VOLUME INDEX: 23.4 ML/M2
LEFT ATRIUM VOLUME MOD: 44.02 CM3
LEFT ATRIUM VOLUME: 50.79 CM3
LEFT INTERNAL DIMENSION IN SYSTOLE: 3.2 CM (ref 2.1–4)
LEFT VENTRICLE DIASTOLIC VOLUME INDEX: 53.48 ML/M2
LEFT VENTRICLE DIASTOLIC VOLUME: 116.06 ML
LEFT VENTRICLE MASS INDEX: 92 G/M2
LEFT VENTRICLE SYSTOLIC VOLUME INDEX: 18.9 ML/M2
LEFT VENTRICLE SYSTOLIC VOLUME: 40.93 ML
LEFT VENTRICULAR INTERNAL DIMENSION IN DIASTOLE: 4.96 CM (ref 3.5–6)
LEFT VENTRICULAR MASS: 199.38 G
LV LATERAL E/E' RATIO: 8.6 M/S
LV SEPTAL E/E' RATIO: 9.56 M/S
LYMPHOCYTES # BLD AUTO: 1.9 K/UL (ref 1–4.8)
LYMPHOCYTES NFR BLD: 14.8 % (ref 18–48)
MAGNESIUM SERPL-MCNC: 2 MG/DL (ref 1.6–2.6)
MCH RBC QN AUTO: 24.9 PG (ref 27–31)
MCHC RBC AUTO-ENTMCNC: 32.4 G/DL (ref 32–36)
MCV RBC AUTO: 77 FL (ref 82–98)
MONOCYTES # BLD AUTO: 1 K/UL (ref 0.3–1)
MONOCYTES NFR BLD: 7.5 % (ref 4–15)
MV A" WAVE DURATION": 9.9 MSEC
MV PEAK A VEL: 0.74 M/S
MV PEAK E VEL: 0.86 M/S
MV STENOSIS PRESSURE HALF TIME: 52.18 MS
MV VALVE AREA P 1/2 METHOD: 4.22 CM2
NEUTROPHILS # BLD AUTO: 9.7 K/UL (ref 1.8–7.7)
NEUTROPHILS NFR BLD: 77.1 % (ref 38–73)
NRBC BLD-RTO: 0 /100 WBC
PISA TR MAX VEL: 2.13 M/S
PLATELET # BLD AUTO: 338 K/UL (ref 150–450)
PMV BLD AUTO: 9.4 FL (ref 9.2–12.9)
POCT GLUCOSE: 86 MG/DL (ref 70–110)
POCT GLUCOSE: 87 MG/DL (ref 70–110)
POTASSIUM SERPL-SCNC: 3.7 MMOL/L (ref 3.5–5.1)
PULM VEIN S/D RATIO: 1.77
PV PEAK D VEL: 0.39 M/S
PV PEAK S VEL: 0.69 M/S
RA MAJOR: 4.66 CM
RA PRESSURE: 3 MMHG
RA WIDTH: 2.01 CM
RBC # BLD AUTO: 4.98 M/UL (ref 4–5.4)
RIGHT VENTRICULAR END-DIASTOLIC DIMENSION: 3.27 CM
SINUS: 3.47 CM
SODIUM SERPL-SCNC: 136 MMOL/L (ref 136–145)
STJ: 3.22 CM
TDI LATERAL: 0.1 M/S
TDI SEPTAL: 0.09 M/S
TDI: 0.1 M/S
TR MAX PG: 18 MMHG
TRICUSPID ANNULAR PLANE SYSTOLIC EXCURSION: 1.69 CM
TV REST PULMONARY ARTERY PRESSURE: 21 MMHG
WBC # BLD AUTO: 12.59 K/UL (ref 3.9–12.7)

## 2022-06-03 PROCEDURE — 63600175 PHARM REV CODE 636 W HCPCS: Performed by: PHYSICIAN ASSISTANT

## 2022-06-03 PROCEDURE — 99217 PR OBSERVATION CARE DISCHARGE: CPT | Mod: ,,, | Performed by: PHYSICIAN ASSISTANT

## 2022-06-03 PROCEDURE — 80048 BASIC METABOLIC PNL TOTAL CA: CPT | Performed by: PHYSICIAN ASSISTANT

## 2022-06-03 PROCEDURE — 36415 COLL VENOUS BLD VENIPUNCTURE: CPT | Performed by: PHYSICIAN ASSISTANT

## 2022-06-03 PROCEDURE — 83735 ASSAY OF MAGNESIUM: CPT | Performed by: PHYSICIAN ASSISTANT

## 2022-06-03 PROCEDURE — G0378 HOSPITAL OBSERVATION PER HR: HCPCS

## 2022-06-03 PROCEDURE — 99217 PR OBSERVATION CARE DISCHARGE: ICD-10-PCS | Mod: ,,, | Performed by: PHYSICIAN ASSISTANT

## 2022-06-03 PROCEDURE — 85025 COMPLETE CBC W/AUTO DIFF WBC: CPT | Performed by: PHYSICIAN ASSISTANT

## 2022-06-03 PROCEDURE — 25000003 PHARM REV CODE 250: Performed by: PHYSICIAN ASSISTANT

## 2022-06-03 PROCEDURE — 87040 BLOOD CULTURE FOR BACTERIA: CPT | Mod: 59 | Performed by: PHYSICIAN ASSISTANT

## 2022-06-03 PROCEDURE — 96361 HYDRATE IV INFUSION ADD-ON: CPT

## 2022-06-03 RX ADMIN — AMOXICILLIN 250 MG: 250 CAPSULE ORAL at 06:06

## 2022-06-03 RX ADMIN — ESCITALOPRAM 5 MG: 5 TABLET, FILM COATED ORAL at 09:06

## 2022-06-03 RX ADMIN — GABAPENTIN 300 MG: 300 CAPSULE ORAL at 09:06

## 2022-06-03 RX ADMIN — ASPIRIN 81 MG: 81 TABLET, COATED ORAL at 09:06

## 2022-06-03 RX ADMIN — SODIUM CHLORIDE, SODIUM LACTATE, POTASSIUM CHLORIDE, AND CALCIUM CHLORIDE 1000 ML: .6; .31; .03; .02 INJECTION, SOLUTION INTRAVENOUS at 10:06

## 2022-06-03 NOTE — ASSESSMENT & PLAN NOTE
AMADOU  - likely orthostatic in the setting of dehydration and use of pain medication, though some concern for possible infection given leukocytosis  - AMADOU with Cr 1.5 on admit--> 1.2 s/p 1L IVF. Given additional 1L prior to discharge. Baseline ~0.9   - imaging reviewed above  - UA w/ 15 WBCs but likely contaminate given 7 squams, no dysuria  - IVFs  - check 2D echo- preserved EF, normal diastolic function  - leukocytosis resolved, suspect stress rxn  - orthostatic post IVF negative  - telemetry unremarkable  - repeat BMP in 1 week  - orthostatic precautions given, encouraged PO intake

## 2022-06-03 NOTE — TELEPHONE ENCOUNTER
----- Message from Luz Maria Jennings sent at 6/3/2022 11:04 AM CDT -----  Contact: patient  [phone]  [employer]IM[employer]ITIVE         Name of Caller:     [facility]    Reason for Visit/Symptoms:    hospital follow-up  Best Contact Number or Confirm if Mychart Preferred: [phone]  Preferred Date/Time of Appointment: within 7 days   Interested in Virtual Visit: no  Additional Information:none

## 2022-06-03 NOTE — ASSESSMENT & PLAN NOTE
- likely orthostatic in the setting of dehydration and use of pain medication, though some concern for possible infection given leukocytosis  - imaging reviewed above  - UA w/ 15 WBCs but likely contaminate given 7 squams, no dysuria  - IVFs  - check 2D echo  - orthostatic vitals q shift  - telemetry   - fall precautions

## 2022-06-03 NOTE — SUBJECTIVE & OBJECTIVE
Past Medical History:   Diagnosis Date    Hypertension        Past Surgical History:   Procedure Laterality Date    colonosocpy  colonoscopy    Polyp 2 mm in the descending colon. Repeat 5 years    FOOT SURGERY      MANDIBLE FRACTURE SURGERY         Review of patient's allergies indicates:  No Known Allergies    No current facility-administered medications on file prior to encounter.     Current Outpatient Medications on File Prior to Encounter   Medication Sig    AMOXICILLIN ORAL Take by mouth.    HYDROCODONE-ACETAMINOPHEN ORAL Take by mouth.    ALPRAZolam (XANAX) 0.25 MG tablet TAKE 1 TABLET BY MOUTH ONCE DAILY AS NEEDED FOR ANXIETY    aspirin (ECOTRIN) 81 MG EC tablet Take 81 mg by mouth once daily.    atorvastatin (LIPITOR) 40 MG tablet TAKE 1 TABLET BY MOUTH ONCE DAILY IN THE EVENING    EScitalopram oxalate (LEXAPRO) 5 MG Tab Take 1 tablet (5 mg total) by mouth once daily.    gabapentin (NEURONTIN) 300 MG capsule Take 1 capsule (300 mg total) by mouth 2 (two) times daily.    lisinopriL-hydrochlorothiazide (PRINZIDE,ZESTORETIC) 20-12.5 mg per tablet Take 1 tablet by mouth once daily.    semaglutide (OZEMPIC) 1 mg/dose (4 mg/3 mL) Inject 1 mg into the skin every 7 days.     Family History       Problem Relation (Age of Onset)    Diabetes Mother, Father, Brother    Heart disease Father    Hyperlipidemia Mother, Father    Hypertension Mother, Father, Brother          Tobacco Use    Smoking status: Never Smoker    Smokeless tobacco: Never Used   Substance and Sexual Activity    Alcohol use: Yes     Alcohol/week: 3.0 standard drinks     Types: 3 Shots of liquor per week    Drug use: Never    Sexual activity: Yes     Partners: Male     Review of Systems   Constitutional:  Negative for chills, fatigue and fever.   HENT:  Positive for dental problem and facial swelling. Negative for voice change.    Eyes:  Negative for photophobia and visual disturbance.   Respiratory:  Negative for chest tightness and shortness of  breath.    Cardiovascular:  Negative for chest pain and leg swelling.   Gastrointestinal:  Positive for diarrhea, nausea and vomiting. Negative for abdominal distention, abdominal pain and blood in stool.   Genitourinary:  Negative for decreased urine volume, difficulty urinating, dysuria, flank pain, frequency and urgency.   Musculoskeletal:  Negative for arthralgias, back pain and gait problem.   Skin:  Negative for color change and wound.   Neurological:  Negative for dizziness, seizures, speech difficulty, weakness, light-headedness and headaches.   Psychiatric/Behavioral:  Negative for agitation, behavioral problems and confusion.    Objective:     Vital Signs (Most Recent):  Temp: 99.3 °F (37.4 °C) (06/02/22 1821)  Pulse: 78 (06/02/22 1821)  Resp: 18 (06/02/22 1821)  BP: 116/67 (06/02/22 1821)  SpO2: 98 % (06/02/22 1821) Vital Signs (24h Range):  Temp:  [98.7 °F (37.1 °C)-99.3 °F (37.4 °C)] 99.3 °F (37.4 °C)  Pulse:  [78-83] 78  Resp:  [18] 18  SpO2:  [97 %-98 %] 98 %  BP: (116-175)/(67-77) 116/67     Weight: 108.9 kg (240 lb)  Body mass index is 37.59 kg/m².    Physical Exam  Vitals and nursing note reviewed.   Constitutional:       General: She is not in acute distress.     Appearance: She is well-developed.   HENT:      Head: Normocephalic and atraumatic.      Mouth/Throat:      Pharynx: No oropharyngeal exudate.   Eyes:      Extraocular Movements: Extraocular movements intact.      Conjunctiva/sclera: Conjunctivae normal.   Cardiovascular:      Rate and Rhythm: Normal rate and regular rhythm.      Heart sounds: Normal heart sounds.   Pulmonary:      Effort: Pulmonary effort is normal. No respiratory distress.      Breath sounds: Normal breath sounds. No wheezing.   Abdominal:      General: Bowel sounds are normal. There is no distension.      Palpations: Abdomen is soft.      Tenderness: There is no abdominal tenderness.   Musculoskeletal:         General: Tenderness (mild R knee) present. Normal range of  motion.      Cervical back: Normal range of motion and neck supple.   Lymphadenopathy:      Cervical: No cervical adenopathy.   Skin:     General: Skin is warm and dry.      Capillary Refill: Capillary refill takes less than 2 seconds.      Findings: No rash.   Neurological:      Mental Status: She is alert and oriented to person, place, and time.      Cranial Nerves: No cranial nerve deficit.      Sensory: No sensory deficit.      Coordination: Coordination normal.   Psychiatric:         Behavior: Behavior normal.         Thought Content: Thought content normal.         Judgment: Judgment normal.           Significant Labs: All pertinent labs within the past 24 hours have been reviewed.  CBC:   Recent Labs   Lab 06/02/22  1759   WBC 17.95*   HGB 13.7   HCT 42.6        CMP:   Recent Labs   Lab 06/02/22  1759   *   K 4.0   CL 96   CO2 24   *   BUN 18   CREATININE 1.5*   CALCIUM 10.3   PROT 8.2   ALBUMIN 4.1   BILITOT 0.7   ALKPHOS 125   AST 28   ALT 24   ANIONGAP 13   EGFRNONAA 37.1*       Significant Imaging: I have reviewed all pertinent imaging results/findings within the past 24 hours.  X-Ray Chest PA And Lateral  Narrative: EXAMINATION:  XR CHEST PA AND LATERAL    CLINICAL HISTORY:  Syncope and collapse    TECHNIQUE:  PA and lateral views of the chest were performed.    COMPARISON:  None    FINDINGS:  Patient is slightly rotated.  Resolution is limited by body habitus with underpenetration.    Trachea is midline and patent.  Nonspecific elevation of the right hemidiaphragm.The lungs are well expanded without consolidation, pleural effusion or pneumothorax.    The cardiac silhouette is normal in size. Mediastinal structures are midline.  There is calcification and slight tortuosity of the aorta.  Pulmonary vasculature and hilar contours are within normal limits.    Osseous structures show minimal degenerative change without acute process seen.  Impression: No radiographic acute intrathoracic  process seen.    Electronically signed by: Tobin Espinoza MD  Date:    06/02/2022  Time:    20:07  CT Maxillofacial Without Contrast  Narrative: EXAMINATION:  CT MAXILLOFACIAL WITHOUT CONTRAST    CLINICAL HISTORY:  Facial trauma, blunt;    TECHNIQUE:  Low dose CT images throughout the region of the facial bones.  Axial, sagittal and coronal reformations were obtained.  Contrast was not administered.    COMPARISON:  None    FINDINGS:  No focal soft tissue swelling identified.  The globes and intraorbital contents are within normal limits.  No orbital fracture.    The remainder of the facial bones appear intact without evidence of an acute displaced fracture.  No osseous destructive lesions.  2-3 mm metallic density right maxilla.    Temporomandibular joints appropriately position without evidence of dislocation.  Patient is edentulous.    Mild patchy paranasal sinus mucosal thickening. Mastoids are clear.    Limited intracranial evaluation is unremarkable.  Impression: No evidence of an acute displaced fracture.    Electronically signed by resident: Albert Altman MD  Date:    06/02/2022  Time:    18:44    Electronically signed by: Tobin Espinoza MD  Date:    06/02/2022  Time:    19:01  X-Ray Knee 3 View Right  Narrative: EXAMINATION:  XR KNEE 3 VIEW RIGHT    CLINICAL HISTORY:  Pain in right knee    TECHNIQUE:  AP, lateral, and Merchant views of the right knee were performed.    COMPARISON:  None    FINDINGS:  Bones are well mineralized. Overall alignment is within normal limits.  No displaced fracture, dislocation or destructive osseous process.  No large suprapatellar joint effusion.  Mild tricompartmental degenerative change.  Localized soft tissue prominence at the anterior infrapatellar region likely representing soft tissue swelling/contusion.  No subcutaneous emphysema or radiodense retained foreign body.  Impression: Anterior right knee suspected localized soft tissue swelling/contusion, without acute displaced  fracture-dislocation identified.    Electronically signed by: Tobin Espinoza MD  Date:    06/02/2022  Time:    18:53  X-Ray Elbow Complete Left  Narrative: EXAMINATION:  XR ELBOW COMPLETE 3 VIEW LEFT    CLINICAL HISTORY:  Pain in left elbow    TECHNIQUE:  AP, lateral, and oblique views of the left elbow were performed.    COMPARISON:  None    FINDINGS:  Bones are well mineralized. Overall alignment is within normal limits.  No displaced fracture, dislocation or destructive osseous process.  No large elbow joint effusion.  Joint spaces appear relatively maintained. No subcutaneous emphysema or radiodense retained foreign body.  Impression: No acute displaced fracture-dislocation identified.    Electronically signed by: Tobin Espinoza MD  Date:    06/02/2022  Time:    18:51

## 2022-06-03 NOTE — ED NOTES
PT assigned room on [facility] is not equipped with bedside monitoring. ED to get telebox for pt prior to transport

## 2022-06-03 NOTE — PLAN OF CARE
Pt admitted and care plan initiated.Telemetry maintained,NSR.Scheduled for an Echo in the am.will monitor orthostatic vs every shift.pt received a liter bolus of LR in the er.monitor renal function.safety precautions maintained.bed in low position.rails up x3.call bell in reach.bed alarm in use for pt safety.continue plan of care.

## 2022-06-03 NOTE — ASSESSMENT & PLAN NOTE
- WBC 17, no fever  - suspect reactionary from recent fall w/ trauma  - will continue amoxicillin for now, low threshold to broaden if becomes febrile/ shows other signs of sepsis  - trend CBC-- resolved without intervention

## 2022-06-03 NOTE — ASSESSMENT & PLAN NOTE
- Cr 1.5, baseline 0.8  - suspect prerenal from dehydration  - IVFs with improvement  - hold home lisinopril-HCTZ, restart at discharge

## 2022-06-03 NOTE — NURSING TRANSFER
Nursing Transfer Note      Pt arrived from the er via stretcher accompanied by transporter.arrived on Telemetry,NSR.vss.pt experienced a syncopal episode at home and landed on her knees.knees red and swollen.assisted pt to br to urinate.safety precautions implemented.bed in low position.rails up x3.call bell in reach.bed alarm activated for pt safety.will monitor.

## 2022-06-03 NOTE — DISCHARGE SUMMARY
[facility] [address]  [facility] Medicine  Discharge Summary      Patient Name: Stephania Rodgers  MRN: [id number]  Patient Class: OP- Observation  Admission Date: 6/2/2022  Hospital Length of Stay: 0 days  Discharge Date and Time: 6/3/2022  2:53 PM  Attending Physician: Emmie Pham MD   Discharging Provider: Peggy Amezcua PA-C  Primary Care Provider: Galina Lester MD  Hospital Medicine Team: [facility] HOSP MED E Peggy Amezcua PA-C    HPI:   Stephania Rodgers is a 61 y.o. female with a PMHx of HTN, anxiety, HLD, and T2DM who presents to [facility] for evaluation of syncope. Patient had an episode of nausea, vomiting and diarrhea around 1am this morning. When she got up from the toilet, she felt lightheaded then subsequently passed out and fell forward hitting her knees, elbow, and face. Unknown how long she was out for. She went to work today but her coworkers advised her to present to the ED after she told them about the syncopal episode this morning. She had multiple tooth extracted 1 week ago. She is currently taking amoxicillin and norco about 3 times per day for pain. Her mouth is still mildly sore and swollen but has progressively improved since the dental procedure last week. No tooth pain, drainage, or abscess. She feels like she is dehydrated because her urine is slightly darker than usual. Denies fever, chills, chest pain, SOB, LE pain, HA, vision changes, dysuria, frequency, or focal weakness.     ED: AFVSS. WBC 17.95, Cr 1.5 (BL ~0.8). XR R knee with localized soft tissue swelling/contusion but without acute fracture or dislocation. CT maxillo without acute abnormalities. Given 1L LR bolus.       * No surgery found *      Hospital Course:   Mrs. Rodgers was admitted to observation for syncope. Suspect orthostatic hypotension in setting hypovolemia from GI losses that resolved prior to admit. Afebrile with leukocytosis of 17.9 on admit, no evidence of infection, suspect stress  reaction. AMADOU on admit, given 1L IVF with improvement. Given additional 1 L IVF prior to discharge and will repeat BMP in 1 week. TTE with preserved EF, normal diastolic function. Orthostatics after IVF negative. Patient discharged on home medications. PCP follow up in 1 week. Repeat BMP in 1 week. Patient verbalized understanding, all questions answered.        Goals of Care Treatment Preferences:  Code Status: Full Code      Consults:     * Syncope  AMADOU  - likely orthostatic in the setting of dehydration and use of pain medication, though some concern for possible infection given leukocytosis  - AMADOU with Cr 1.5 on admit--> 1.2 s/p 1L IVF. Given additional 1L prior to discharge. Baseline ~0.9   - imaging reviewed above  - UA w/ 15 WBCs but likely contaminate given 7 squams, no dysuria  - IVFs  - check 2D echo- preserved EF, normal diastolic function  - leukocytosis resolved, suspect stress rxn  - orthostatic post IVF negative  - telemetry unremarkable  - repeat BMP in 1 week  - orthostatic precautions given, encouraged PO intake    Leukocytosis  - WBC 17, no fever  - suspect reactionary from recent fall w/ trauma  - will continue amoxicillin for now, low threshold to broaden if becomes febrile/ shows other signs of sepsis  - trend CBC-- resolved without intervention    AMADOU (acute kidney injury)  - Cr 1.5, baseline 0.8  - suspect prerenal from dehydration  - IVFs with improvement  - hold home lisinopril-HCTZ, restart at discharge      Final Active Diagnoses:    Diagnosis Date Noted POA    PRINCIPAL PROBLEM:  Syncope [R55] 06/02/2022 Yes    S/P tooth extraction [K08.409] 06/02/2022 Yes    AMADOU (acute kidney injury) [N17.9] 06/02/2022 Yes    Leukocytosis [D72.829] 06/02/2022 Yes    Hypertension [I10]  Yes    Anxiety [F41.9] 12/17/2020 Yes    Type 2 diabetes mellitus without complication, without long-term current use of insulin [E11.9] 06/18/2020 Yes      Problems Resolved During this Admission:       Discharged  Condition: good    Disposition: Home or Self Care    Follow Up:   Follow-up Information       Galina Lester MD. Schedule an appointment as soon as possible for a visit in 1 week(s).    Specialty: Internal Medicine  Why: Message sent to office to schedule for hospital follow up appointment in one week.  Contact information:  [address] [facility]  [address]  [place] [address]  [phone]                           Patient Instructions:      Basic Metabolic Panel   Standing Status: Future Standing Exp. Date: 08/02/23     Diet diabetic     Diet Cardiac     Notify your health care provider if you experience any of the following:  temperature >100.4     Notify your health care provider if you experience any of the following:  persistent nausea and vomiting or diarrhea     Notify your health care provider if you experience any of the following:  increased confusion or weakness     Notify your health care provider if you experience any of the following:  persistent dizziness, light-headedness, or visual disturbances     Activity as tolerated       Significant Diagnostic Studies: Labs:   CMP   Recent Labs   Lab 06/02/22  1759 06/03/22  0231   * 136   K 4.0 3.7   CL 96 98   CO2 24 26   * 94   BUN 18 16   CREATININE 1.5* 1.2   CALCIUM 10.3 9.7   PROT 8.2  --    ALBUMIN 4.1  --    BILITOT 0.7  --    ALKPHOS 125  --    AST 28  --    ALT 24  --    ANIONGAP 13 12   ESTGFRAFRICA 42.7* 56.0*   EGFRNONAA 37.1* 48.6*   , CBC   Recent Labs   Lab 06/02/22  1759 06/03/22  0231   WBC 17.95* 12.59   HGB 13.7 12.4   HCT 42.6 38.3    338    and Troponin   Recent Labs   Lab 06/02/22 1759   TROPONINI <0.006     Cardiac Graphics: Echocardiogram:   Transthoracic echo (TTE) complete (Cupid Only):   Results for orders placed or performed during the hospital encounter of 06/02/22   Echo   Result Value Ref Range    BSA 2.25 m2    TDI SEPTAL 0.09 m/s    LV LATERAL E/E' RATIO 8.60 m/s    LV SEPTAL E/E' RATIO 9.56 m/s    LA WIDTH  "3.65 cm    TDI LATERAL 0.10 m/s    LVIDd 4.96 3.5 - 6.0 cm    IVS 1.15 (A) 0.6 - 1.1 cm    Posterior Wall 1.01 0.6 - 1.1 cm    LVIDs 3.20 2.1 - 4.0 cm    FS 35 28 - 44 %    LA volume 50.79 cm3    Sinus 3.47 cm    STJ 3.22 cm    Ascending aorta 3.49 cm    LV mass 199.38 g    LA size 3.16 cm    RVDD 3.27 cm    TAPSE 1.69 cm    Left Ventricle Relative Wall Thickness 0.41 cm    AV mean gradient 7 mmHg    AV valve area 3.12 cm2    AV Velocity Ratio 0.85     AV index (prosthetic) 0.94     MV valve area p 1/2 method 4.22 cm2    E/A ratio 1.16     Mean e' 0.10 m/s    E wave deceleration time 179.94 msec    MV "A" wave duration 9.90 msec    Pulm vein S/D ratio 1.77     LVOT diameter 2.06 cm    LVOT area 3.3 cm2    LVOT peak melvin 1.50 m/s    LVOT peak VTI 31.00 cm    Ao peak melvin 1.76 m/s    Ao VTI 33.09 cm    LVOT stroke volume 103.27 cm3    AV peak gradient 12 mmHg    E/E' ratio 9.05 m/s    MV Peak E Melvin 0.86 m/s    TR Max Melvin 2.13 m/s    MV stenosis pressure 1/2 time 52.18 ms    MV Peak A Melvin 0.74 m/s    PV Peak S Melvin 0.69 m/s    PV Peak D Melvin 0.39 m/s    LV Systolic Volume 40.93 mL    LV Systolic Volume Index 18.9 mL/m2    LV Diastolic Volume 116.06 mL    LV Diastolic Volume Index 53.48 mL/m2    LA Volume Index 23.4 mL/m2    LV Mass Index 92 g/m2    RA Major Axis 4.66 cm    Left Atrium Minor Axis 5.34 cm    Left Atrium Major Axis 5.03 cm    Triscuspid Valve Regurgitation Peak Gradient 18 mmHg    LA Volume Index (Mod) 20.3 mL/m2    LA volume (mod) 44.02 cm3    RA Width 2.01 cm    Right Atrial Pressure (from IVC) 3 mmHg    EF 68 %    TV rest pulmonary artery pressure 21 mmHg    Narrative    · The left ventricle is normal in size with normal systolic function.  · The estimated ejection fraction is 68%.  · Normal left ventricular diastolic function.  · Normal right ventricular size with normal right ventricular systolic   function.  · Normal central venous pressure (3 mmHg).  · The estimated PA systolic pressure is 21 mmHg.  · " Trivial posterior pericardial effusion.          Pending Diagnostic Studies:       None           Medications:  Reconciled Home Medications:      Medication List        CONTINUE taking these medications      ALPRAZolam 0.25 MG tablet  Commonly known as: XANAX  TAKE 1 TABLET BY MOUTH ONCE DAILY AS NEEDED FOR ANXIETY     AMOXICILLIN ORAL  Take by mouth.     aspirin 81 MG EC tablet  Commonly known as: ECOTRIN  Take 81 mg by mouth once daily.     atorvastatin 40 MG tablet  Commonly known as: LIPITOR  TAKE 1 TABLET BY MOUTH ONCE DAILY IN THE EVENING     EScitalopram oxalate 5 MG Tab  Commonly known as: LEXAPRO  Take 1 tablet (5 mg total) by mouth once daily.     gabapentin 300 MG capsule  Commonly known as: NEURONTIN  Take 1 capsule (300 mg total) by mouth 2 (two) times daily.     HYDROCODONE-ACETAMINOPHEN ORAL  Take by mouth.     lisinopriL-hydrochlorothiazide 20-12.5 mg per tablet  Commonly known as: PRINZIDE,ZESTORETIC  Take 1 tablet by mouth once daily.     OZEMPIC 1 mg/dose (4 mg/3 mL)  Generic drug: semaglutide  Inject 1 mg into the skin every 7 days.              Indwelling Lines/Drains at time of discharge:   Lines/Drains/Airways       None                   Time spent on the discharge of patient: 36 minutes         Peggy Amezcua PA-C  Department of Hospital Medicine  [facility] - Telemetry Stepdown ([place]-[facility])

## 2022-06-03 NOTE — TELEPHONE ENCOUNTER
----- Message from Luz Maria Jennings sent at 6/3/2022 11:04 AM CDT -----  Contact: patient  [phone]  CCTIMESENSITIVE         Name of Caller:     [facility]    Reason for Visit/Symptoms:    hospital follow-up  Best Contact Number or Confirm if Mychart Preferred: [phone]  Preferred Date/Time of Appointment: within 7 days   Interested in Virtual Visit: no  Additional Information:none

## 2022-06-03 NOTE — ASSESSMENT & PLAN NOTE
- WBC 17, no fever  - suspect reactionary from recent fall w/ trauma  - will continue amoxicillin for now, low threshold to broaden if becomes febrile/ shows other signs of sepsis  - trend CBC

## 2022-06-03 NOTE — H&P
[facility] - Emergency Dept  [facility] Medicine  History & Physical    Patient Name: Stephania Rodgers  MRN: [id number]  Patient Class: OP- Observation  Admission Date: 6/2/2022  Attending Physician: Emmie Pham MD   Primary Care Provider: Galina Lester MD         Patient information was obtained from patient, relative(s), past medical records and ER records.     Subjective:     Principal Problem:Syncope    Chief Complaint:   Chief Complaint   Patient presents with    Fall     Fell this morning. Right knee and left arm pain        HPI: Stephania Rodgers is a 61 y.o. female with a PMHx of HTN, anxiety, HLD, and T2DM who presents to [facility] for evaluation of syncope. Patient had an episode of nausea, vomiting and diarrhea around 1am this morning. When she got up from the toilet, she felt lightheaded then subsequently passed out and fell forward hitting her knees, elbow, and face. Unknown how long she was out for. She went to work today but her coworkers advised her to present to the ED after she told them about the syncopal episode this morning. She had multiple tooth extracted 1 week ago. She is currently taking amoxicillin and norco about 3 times per day for pain. Her mouth is still mildly sore and swollen but has progressively improved since the dental procedure last week. No tooth pain, drainage, or abscess. She feels like she is dehydrated because her urine is slightly darker than usual. Denies fever, chills, chest pain, SOB, LE pain, HA, vision changes, dysuria, frequency, or focal weakness.     ED: AFVSS. WBC 17.95, Cr 1.5 (BL ~0.8). XR R knee with localized soft tissue swelling/contusion but without acute fracture or dislocation. CT maxillo without acute abnormalities. Given 1L LR bolus.       Past Medical History:   Diagnosis Date    Hypertension        Past Surgical History:   Procedure Laterality Date    colonosocpy  colonoscopy    Polyp 2 mm in the descending colon. Repeat 5 years    FOOT SURGERY       MANDIBLE FRACTURE SURGERY         Review of patient's allergies indicates:  No Known Allergies    No current facility-administered medications on file prior to encounter.     Current Outpatient Medications on File Prior to Encounter   Medication Sig    AMOXICILLIN ORAL Take by mouth.    HYDROCODONE-ACETAMINOPHEN ORAL Take by mouth.    ALPRAZolam (XANAX) 0.25 MG tablet TAKE 1 TABLET BY MOUTH ONCE DAILY AS NEEDED FOR ANXIETY    aspirin (ECOTRIN) 81 MG EC tablet Take 81 mg by mouth once daily.    atorvastatin (LIPITOR) 40 MG tablet TAKE 1 TABLET BY MOUTH ONCE DAILY IN THE EVENING    EScitalopram oxalate (LEXAPRO) 5 MG Tab Take 1 tablet (5 mg total) by mouth once daily.    gabapentin (NEURONTIN) 300 MG capsule Take 1 capsule (300 mg total) by mouth 2 (two) times daily.    lisinopriL-hydrochlorothiazide (PRINZIDE,ZESTORETIC) 20-12.5 mg per tablet Take 1 tablet by mouth once daily.    semaglutide (OZEMPIC) 1 mg/dose (4 mg/3 mL) Inject 1 mg into the skin every 7 days.     Family History       Problem Relation (Age of Onset)    Diabetes Mother, Father, Brother    Heart disease Father    Hyperlipidemia Mother, Father    Hypertension Mother, Father, Brother          Tobacco Use    Smoking status: Never Smoker    Smokeless tobacco: Never Used   Substance and Sexual Activity    Alcohol use: Yes     Alcohol/week: 3.0 standard drinks     Types: 3 Shots of liquor per week    Drug use: Never    Sexual activity: Yes     Partners: Male     Review of Systems   Constitutional:  Negative for chills, fatigue and fever.   HENT:  Positive for dental problem and facial swelling. Negative for voice change.    Eyes:  Negative for photophobia and visual disturbance.   Respiratory:  Negative for chest tightness and shortness of breath.    Cardiovascular:  Negative for chest pain and leg swelling.   Gastrointestinal:  Positive for diarrhea, nausea and vomiting. Negative for abdominal distention, abdominal pain and blood in  stool.   Genitourinary:  Negative for decreased urine volume, difficulty urinating, dysuria, flank pain, frequency and urgency.   Musculoskeletal:  Negative for arthralgias, back pain and gait problem.   Skin:  Negative for color change and wound.   Neurological:  Negative for dizziness, seizures, speech difficulty, weakness, light-headedness and headaches.   Psychiatric/Behavioral:  Negative for agitation, behavioral problems and confusion.    Objective:     Vital Signs (Most Recent):  Temp: 99.3 °F (37.4 °C) (06/02/22 1821)  Pulse: 78 (06/02/22 1821)  Resp: 18 (06/02/22 1821)  BP: 116/67 (06/02/22 1821)  SpO2: 98 % (06/02/22 1821) Vital Signs (24h Range):  Temp:  [98.7 °F (37.1 °C)-99.3 °F (37.4 °C)] 99.3 °F (37.4 °C)  Pulse:  [78-83] 78  Resp:  [18] 18  SpO2:  [97 %-98 %] 98 %  BP: (116-175)/(67-77) 116/67     Weight: 108.9 kg (240 lb)  Body mass index is 37.59 kg/m².    Physical Exam  Vitals and nursing note reviewed.   Constitutional:       General: She is not in acute distress.     Appearance: She is well-developed.   HENT:      Head: Normocephalic and atraumatic.      Mouth/Throat:      Pharynx: No oropharyngeal exudate.   Eyes:      Extraocular Movements: Extraocular movements intact.      Conjunctiva/sclera: Conjunctivae normal.   Cardiovascular:      Rate and Rhythm: Normal rate and regular rhythm.      Heart sounds: Normal heart sounds.   Pulmonary:      Effort: Pulmonary effort is normal. No respiratory distress.      Breath sounds: Normal breath sounds. No wheezing.   Abdominal:      General: Bowel sounds are normal. There is no distension.      Palpations: Abdomen is soft.      Tenderness: There is no abdominal tenderness.   Musculoskeletal:         General: Tenderness (mild R knee) present. Normal range of motion.      Cervical back: Normal range of motion and neck supple.   Lymphadenopathy:      Cervical: No cervical adenopathy.   Skin:     General: Skin is warm and dry.      Capillary Refill:  Capillary refill takes less than 2 seconds.      Findings: No rash.   Neurological:      Mental Status: She is alert and oriented to person, place, and time.      Cranial Nerves: No cranial nerve deficit.      Sensory: No sensory deficit.      Coordination: Coordination normal.   Psychiatric:         Behavior: Behavior normal.         Thought Content: Thought content normal.         Judgment: Judgment normal.           Significant Labs: All pertinent labs within the past 24 hours have been reviewed.  CBC:   Recent Labs   Lab 06/02/22  1759   WBC 17.95*   HGB 13.7   HCT 42.6        CMP:   Recent Labs   Lab 06/02/22  1759   *   K 4.0   CL 96   CO2 24   *   BUN 18   CREATININE 1.5*   CALCIUM 10.3   PROT 8.2   ALBUMIN 4.1   BILITOT 0.7   ALKPHOS 125   AST 28   ALT 24   ANIONGAP 13   EGFRNONAA 37.1*       Significant Imaging: I have reviewed all pertinent imaging results/findings within the past 24 hours.  X-Ray Chest PA And Lateral  Narrative: EXAMINATION:  XR CHEST PA AND LATERAL    CLINICAL HISTORY:  Syncope and collapse    TECHNIQUE:  PA and lateral views of the chest were performed.    COMPARISON:  None    FINDINGS:  Patient is slightly rotated.  Resolution is limited by body habitus with underpenetration.    Trachea is midline and patent.  Nonspecific elevation of the right hemidiaphragm.The lungs are well expanded without consolidation, pleural effusion or pneumothorax.    The cardiac silhouette is normal in size. Mediastinal structures are midline.  There is calcification and slight tortuosity of the aorta.  Pulmonary vasculature and hilar contours are within normal limits.    Osseous structures show minimal degenerative change without acute process seen.  Impression: No radiographic acute intrathoracic process seen.    Electronically signed by: Tobin Espinoza MD  Date:    06/02/2022  Time:    20:07  CT Maxillofacial Without Contrast  Narrative: EXAMINATION:  CT MAXILLOFACIAL WITHOUT  CONTRAST    CLINICAL HISTORY:  Facial trauma, blunt;    TECHNIQUE:  Low dose CT images throughout the region of the facial bones.  Axial, sagittal and coronal reformations were obtained.  Contrast was not administered.    COMPARISON:  None    FINDINGS:  No focal soft tissue swelling identified.  The globes and intraorbital contents are within normal limits.  No orbital fracture.    The remainder of the facial bones appear intact without evidence of an acute displaced fracture.  No osseous destructive lesions.  2-3 mm metallic density right maxilla.    Temporomandibular joints appropriately position without evidence of dislocation.  Patient is edentulous.    Mild patchy paranasal sinus mucosal thickening. Mastoids are clear.    Limited intracranial evaluation is unremarkable.  Impression: No evidence of an acute displaced fracture.    Electronically signed by resident: Albert Altman MD  Date:    06/02/2022  Time:    18:44    Electronically signed by: Tobin Espinoza MD  Date:    06/02/2022  Time:    19:01  X-Ray Knee 3 View Right  Narrative: EXAMINATION:  XR KNEE 3 VIEW RIGHT    CLINICAL HISTORY:  Pain in right knee    TECHNIQUE:  AP, lateral, and Merchant views of the right knee were performed.    COMPARISON:  None    FINDINGS:  Bones are well mineralized. Overall alignment is within normal limits.  No displaced fracture, dislocation or destructive osseous process.  No large suprapatellar joint effusion.  Mild tricompartmental degenerative change.  Localized soft tissue prominence at the anterior infrapatellar region likely representing soft tissue swelling/contusion.  No subcutaneous emphysema or radiodense retained foreign body.  Impression: Anterior right knee suspected localized soft tissue swelling/contusion, without acute displaced fracture-dislocation identified.    Electronically signed by: Tobin Espinoza MD  Date:    06/02/2022  Time:    18:53  X-Ray Elbow Complete Left  Narrative: EXAMINATION:  XR ELBOW  COMPLETE 3 VIEW LEFT    CLINICAL HISTORY:  Pain in left elbow    TECHNIQUE:  AP, lateral, and oblique views of the left elbow were performed.    COMPARISON:  None    FINDINGS:  Bones are well mineralized. Overall alignment is within normal limits.  No displaced fracture, dislocation or destructive osseous process.  No large elbow joint effusion.  Joint spaces appear relatively maintained. No subcutaneous emphysema or radiodense retained foreign body.  Impression: No acute displaced fracture-dislocation identified.    Electronically signed by: Tobin Espinoza MD  Date:    06/02/2022  Time:    18:51      Assessment/Plan:     * Syncope  - likely orthostatic in the setting of dehydration and use of pain medication, though some concern for possible infection given leukocytosis  - imaging reviewed above  - UA w/ 15 WBCs but likely contaminate given 7 squams, no dysuria  - IVFs  - check 2D echo  - orthostatic vitals q shift  - telemetry   - fall precautions     AMADOU (acute kidney injury)  - Cr 1.5, baseline 0.8  - suspect prerenal from dehydration  - IVFs  - hold home lisinopril-HCTZ    Leukocytosis  - WBC 17, no fever  - suspect reactionary from recent fall w/ trauma  - will continue amoxicillin for now, low threshold to broaden if becomes febrile/ shows other signs of sepsis  - trend CBC    Hypertension  - hold lisinopril-HCTZ in the setting of AMADOU     S/P tooth extraction  - 1 week ago, symptoms improving  - continue amoxicillin and PRN norco     Anxiety  - continue lexapro and PRN xanax    Type 2 diabetes mellitus without complication, without long-term current use of insulin  - home regimen: ozempic weekly  - inpatient regimen: LDSSI  - ACHS accuchecks  - diabetic diet    VTE Risk Mitigation (From admission, onward)         Ordered     enoxaparin injection 40 mg  Daily         06/02/22 2145     IP VTE HIGH RISK PATIENT  Once         06/02/22 2145                   Clemencia Abdullahi PA-C  Department of Hospital Medicine    [facility] - Emergency Dept

## 2022-06-03 NOTE — PLAN OF CARE
[facility] - Telemetry Stepdown ([facility])  Discharge Assessment    Primary Care Provider: Galina Lester MD     CM at bedside to discuss discharge planning assessment.   Patient lives with her  in a one-story home.  Independent with ADL and does not use medical equipment.   Explained CM services during patient's stay in hospital.   My Health packet discussed and left with patient.    Discharge Assessment (most recent)     BRIEF DISCHARGE ASSESSMENT - 06/03/22 6256        Discharge Planning    Assessment Type Discharge Planning Brief Assessment     Resource/Environmental Concerns none     Support Systems Spouse/significant other     Equipment Currently Used at Home none     Current Living Arrangements home/apartment/condo     Patient/Family Anticipates Transition to home with family     Patient/Family Anticipated Services at Transition none     DME Needed Upon Discharge  none     Discharge Plan A Home with family     Discharge Plan B Home with family

## 2022-06-03 NOTE — PLAN OF CARE
[facility] - Telemetry Stepdown ([facility]  Discharge Final Note    Primary Care Provider: Galina Lester MD    Expected Discharge Date: 6/3/2022    Final Discharge Note (most recent)     Final Note - 06/03/22 1620        Final Note    Assessment Type Final Discharge Note     Anticipated Discharge Disposition Home or Self Care        Post-Acute Status    Post-Acute Authorization Other     Other Status No Post-Acute Service Needs     Discharge Delays None known at this time                 Important Message from Medicare             Contact Info     Galina Lester MD   Specialty: Internal Medicine   Relationship: PCP - General    [address] [place] [address]  [address]  [facility] [address]   Phone: [phone]       Next Steps: Schedule an appointment as soon as possible for a visit in 1 week(s)    Instructions: Message sent to office to schedule for hospital follow up appointment in one week.

## 2022-06-03 NOTE — HPI
Stephania Rodgers is a 61 y.o. female with a PMHx of HTN, anxiety, HLD, and T2DM who [place] [facility] [place]. Patient had an episode of nausea, vomiting and diarrhea around 1am this morning. When she got up from the toilet, she felt lightheaded then subsequently passed out and fell forward hitting her knees, elbow, and face. Unknown how long she was out for. She went to work today but her coworkers advised her to present to the ED after she told them about the syncopal episode this morning. She had multiple tooth extracted 1 week ago. She is currently taking amoxicillin and norco about 3 times per day for pain. Her mouth is still mildly sore and swollen but has progressively improved since the dental procedure last week. No tooth pain, drainage, or abscess. She feels like she is dehydrated because her urine is slightly darker than usual. Denies fever, chills, chest pain, SOB, LE pain, HA, vision changes, dysuria, frequency, or focal weakness.     ED: AFVSS. WBC 17.95, Cr 1.5 (BL ~0.8). XR R knee with localized soft tissue swelling/contusion but without acute fracture or dislocation. CT maxillo without acute abnormalities. Given 1L LR bolus.

## 2022-06-03 NOTE — HOSPITAL COURSE
Mrs. Rodgers was admitted to observation for syncope. Suspect orthostatic hypotension in setting hypovolemia from GI losses that resolved prior to admit. Afebrile with leukocytosis of 17.9 on admit, no evidence of infection, suspect stress reaction. AMADOU on admit, given 1L IVF with improvement. Given additional 1 L IVF prior to discharge and will repeat BMP in 1 week. TTE with preserved EF, normal diastolic function. Orthostatics after IVF negative. Patient discharged on home medications. PCP follow up in 1 week. Repeat BMP in 1 week. Patient verbalized understanding, all questions answered.

## 2022-06-03 NOTE — RESPIRATORY THERAPY
RAPID RESPONSE RESPIRATORY CHART CHECK       Chart check completed, call [phone] for further concerns or assistance.

## 2022-06-05 LAB — BACTERIA UR CULT: ABNORMAL

## 2022-06-08 LAB
BACTERIA BLD CULT: NORMAL
BACTERIA BLD CULT: NORMAL

## 2022-06-11 ENCOUNTER — HOSPITAL ENCOUNTER (OUTPATIENT)
Dept: RADIOLOGY | Facility: HOSPITAL | Age: 62
Discharge: HOME | End: 2022-06-11
Attending: INTERNAL MEDICINE
Payer: COMMERCIAL

## 2022-06-11 DIAGNOSIS — Z12.31 ENCOUNTER FOR SCREENING MAMMOGRAM FOR MALIGNANT NEOPLASM OF BREAST: ICD-10-CM

## 2022-06-11 PROCEDURE — 77067 MAMMO DIGITAL SCREENING BILAT WITH TOMO: ICD-10-PCS | Mod: 26,,, | Performed by: RADIOLOGY

## 2022-06-11 PROCEDURE — 77063 BREAST TOMOSYNTHESIS BI: CPT | Mod: 26,,, | Performed by: RADIOLOGY

## 2022-06-11 PROCEDURE — 77067 SCR MAMMO BI INCL CAD: CPT | Mod: TC

## 2022-06-11 PROCEDURE — 77067 SCR MAMMO BI INCL CAD: CPT | Mod: 26,,, | Performed by: RADIOLOGY

## 2022-06-11 PROCEDURE — 77063 MAMMO DIGITAL SCREENING BILAT WITH TOMO: ICD-10-PCS | Mod: 26,,, | Performed by: RADIOLOGY

## 2022-06-14 DIAGNOSIS — R92.8 ABNORMAL MAMMOGRAM OF LEFT BREAST: Primary | ICD-10-CM

## 2022-07-08 ENCOUNTER — HOSPITAL ENCOUNTER (OUTPATIENT)
Dept: RADIOLOGY | Facility: HOSPITAL | Age: 62
Discharge: HOME | End: 2022-07-08
Attending: NURSE PRACTITIONER
Payer: COMMERCIAL

## 2022-07-08 ENCOUNTER — TELEPHONE (OUTPATIENT)
Dept: INTERNAL MEDICINE | Facility: CLINIC | Age: 62
End: 2022-07-08
Payer: COMMERCIAL

## 2022-07-08 DIAGNOSIS — R92.8 ABNORMAL MAMMOGRAM OF LEFT BREAST: ICD-10-CM

## 2022-07-08 PROCEDURE — 77061 MAMMO DIGITAL DIAGNOSTIC LEFT WITH TOMO: ICD-10-PCS | Mod: 26,LT,, | Performed by: RADIOLOGY

## 2022-07-08 PROCEDURE — 77065 DX MAMMO INCL CAD UNI: CPT | Mod: TC,LT

## 2022-07-08 PROCEDURE — 77061 BREAST TOMOSYNTHESIS UNI: CPT | Mod: 26,LT,, | Performed by: RADIOLOGY

## 2022-07-08 PROCEDURE — 77065 MAMMO DIGITAL DIAGNOSTIC LEFT WITH TOMO: ICD-10-PCS | Mod: 26,LT,, | Performed by: RADIOLOGY

## 2022-07-08 PROCEDURE — 77065 DX MAMMO INCL CAD UNI: CPT | Mod: 26,LT,, | Performed by: RADIOLOGY

## 2022-07-11 ENCOUNTER — TELEPHONE (OUTPATIENT)
Dept: RADIOLOGY | Facility: HOSPITAL | Age: 62
End: 2022-07-11
Payer: COMMERCIAL

## 2022-07-11 ENCOUNTER — PATIENT MESSAGE (OUTPATIENT)
Dept: ADMINISTRATIVE | Facility: HOSPITAL | Age: 62
End: 2022-07-11
Payer: COMMERCIAL

## 2022-07-11 NOTE — TELEPHONE ENCOUNTER
Patient called to schedule Tim biopsy at [facility]. Scheduled patient according to next available appointment.Gave patient directions on how to get to [facility].

## 2022-07-18 ENCOUNTER — PATIENT MESSAGE (OUTPATIENT)
Dept: INTERNAL MEDICINE | Facility: CLINIC | Age: 62
End: 2022-07-18
Payer: COMMERCIAL

## 2022-07-20 ENCOUNTER — PATIENT OUTREACH (OUTPATIENT)
Dept: ADMINISTRATIVE | Facility: HOSPITAL | Age: 62
End: 2022-07-20
Payer: COMMERCIAL

## 2022-07-20 NOTE — LETTER
AUTHORIZATION FOR RELEASE OF   CONFIDENTIAL INFORMATION    Dear Dr. Neely,    We are seeing Stephania Rodgers, date of birth 1960, in the clinic at [facility]. Galina Lester MD is the patient's PCP. Stephania Rodgers has an outstanding lab/procedure at the time we reviewed her chart. In order to help keep her health information updated, she has authorized us to request the following medical record(s):        (  )  MAMMOGRAM                                      (  )  COLONOSCOPY      (  )  PAP SMEAR                                          (  )  OUTSIDE LAB RESULTS     (  )  DEXA SCAN                                          ( X )  DIABETIC EYE EXAM            (  )  FOOT EXAM                                          (  )  ENTIRE RECORD     (  )  OUTSIDE IMMUNIZATIONS                 (  )  _______________         Please fax records to Galina Lester MD, [phone].     If you have any questions, please contact BREANA Valencia at [phone].           Patient Name: Stephania Rodgers  : 1960  Patient Phone #: [phone]

## 2022-07-20 NOTE — PROGRESS NOTES
Health Maintenance Due   Topic Date Due    Shingles Vaccine (1 of 2) Never done    Pneumococcal Vaccines (Age 0-64) (2 - PCV) 10/17/2018    Eye Exam  03/09/2021    COVID-19 Vaccine (4 - Booster for Pfizer series) 02/22/2022    Foot Exam  06/17/2022     Chart review done.  updated. Immunizations reviewed & updated. Care Everywhere updated..    Request for Eye Exam Report sent to provider.

## 2022-07-25 ENCOUNTER — HOSPITAL ENCOUNTER (OUTPATIENT)
Dept: RADIOLOGY | Facility: HOSPITAL | Age: 62
Discharge: HOME | End: 2022-07-25
Attending: NURSE PRACTITIONER
Payer: COMMERCIAL

## 2022-07-25 DIAGNOSIS — R92.8 ABNORMAL MAMMOGRAM: ICD-10-CM

## 2022-07-25 DIAGNOSIS — E11.9 TYPE 2 DIABETES MELLITUS WITHOUT COMPLICATION, UNSPECIFIED WHETHER LONG TERM INSULIN USE: ICD-10-CM

## 2022-07-25 PROCEDURE — 19081 BX BREAST 1ST LESION STRTCTC: CPT | Mod: LT,,, | Performed by: RADIOLOGY

## 2022-07-25 PROCEDURE — 88305 TISSUE EXAM BY PATHOLOGIST: ICD-10-PCS | Mod: 26,,, | Performed by: STUDENT IN AN ORGANIZED HEALTH CARE EDUCATION/TRAINING PROGRAM

## 2022-07-25 PROCEDURE — 19081 MAMMO BREAST STEREOTACTIC BREAST BIOPSY LEFT: ICD-10-PCS | Mod: LT,,, | Performed by: RADIOLOGY

## 2022-07-25 PROCEDURE — 27200939 MAMMO BREAST STEREOTACTIC BREAST BIOPSY LEFT

## 2022-07-25 PROCEDURE — 25000003 PHARM REV CODE 250: Performed by: NURSE PRACTITIONER

## 2022-07-25 PROCEDURE — 88305 TISSUE EXAM BY PATHOLOGIST: CPT | Performed by: STUDENT IN AN ORGANIZED HEALTH CARE EDUCATION/TRAINING PROGRAM

## 2022-07-25 PROCEDURE — 77065 DX MAMMO INCL CAD UNI: CPT | Mod: TC,LT

## 2022-07-25 PROCEDURE — 77065 MAMMO DIGITAL DIAGNOSTIC LEFT: ICD-10-PCS | Mod: 26,LT,, | Performed by: RADIOLOGY

## 2022-07-25 PROCEDURE — 88305 TISSUE EXAM BY PATHOLOGIST: CPT | Mod: 26,,, | Performed by: STUDENT IN AN ORGANIZED HEALTH CARE EDUCATION/TRAINING PROGRAM

## 2022-07-25 PROCEDURE — 77065 DX MAMMO INCL CAD UNI: CPT | Mod: 26,LT,, | Performed by: RADIOLOGY

## 2022-07-25 RX ORDER — SODIUM BICARBONATE 42 MG/ML
3 INJECTION, SOLUTION INTRAVENOUS ONCE
Status: COMPLETED | OUTPATIENT
Start: 2022-07-25 | End: 2022-07-25

## 2022-07-25 RX ORDER — LIDOCAINE HYDROCHLORIDE AND EPINEPHRINE 20; 10 MG/ML; UG/ML
20 INJECTION, SOLUTION INFILTRATION; PERINEURAL ONCE
Status: DISCONTINUED | OUTPATIENT
Start: 2022-07-25 | End: 2022-07-26 | Stop reason: HOSPADM

## 2022-07-25 RX ORDER — LIDOCAINE HYDROCHLORIDE 10 MG/ML
3 INJECTION INFILTRATION; PERINEURAL ONCE
Status: COMPLETED | OUTPATIENT
Start: 2022-07-25 | End: 2022-07-25

## 2022-07-25 RX ORDER — LIDOCAINE HCL/EPINEPHRINE/PF 2%-1:200K
20 VIAL (ML) INJECTION ONCE
Status: COMPLETED | OUTPATIENT
Start: 2022-07-25 | End: 2022-07-25

## 2022-07-25 RX ADMIN — LIDOCAINE HYDROCHLORIDE,EPINEPHRINE BITARTRATE 20 ML: 20; .005 INJECTION, SOLUTION EPIDURAL; INFILTRATION; INTRACAUDAL; PERINEURAL at 09:07

## 2022-07-25 RX ADMIN — LIDOCAINE HYDROCHLORIDE 3 ML: 10 INJECTION, SOLUTION EPIDURAL; INFILTRATION; INTRACAUDAL; PERINEURAL at 09:07

## 2022-07-25 RX ADMIN — SODIUM BICARBONATE 3 ML: 42 INJECTION, SOLUTION INTRAVENOUS at 09:07

## 2022-07-29 ENCOUNTER — PATIENT MESSAGE (OUTPATIENT)
Dept: ADMINISTRATIVE | Facility: HOSPITAL | Age: 62
End: 2022-07-29
Payer: COMMERCIAL

## 2022-07-29 ENCOUNTER — PATIENT OUTREACH (OUTPATIENT)
Dept: ADMINISTRATIVE | Facility: HOSPITAL | Age: 62
End: 2022-07-29
Payer: COMMERCIAL

## 2022-07-29 NOTE — PROGRESS NOTES
Health Maintenance Due   Topic Date Due    Shingles Vaccine (1 of 2) Never done    Pneumococcal Vaccines (Age 0-64) (2 - PCV) 10/17/2018    Eye Exam  03/09/2021    COVID-19 Vaccine (4 - Booster for Pfizer series) 02/22/2022    Foot Exam  06/17/2022   .2week  Confirmed Eye Exam provider, faxed [facility] for Diabetic Eye Exam.

## 2022-07-29 NOTE — LETTER
AUTHORIZATION FOR RELEASE OF   CONFIDENTIAL INFORMATION    Dear Dr. Neely,    We are seeing Stephania Rodgers, date of birth 1960, in the clinic at [facility]. Galina Lester MD is the patient's PCP. Stephania Rodgers has an outstanding lab/procedure at the time we reviewed her chart. In order to help keep her health information updated, she has authorized us to request the following medical record(s):        (  )  MAMMOGRAM                                      (  )  COLONOSCOPY      (  )  PAP SMEAR                                          (  )  OUTSIDE LAB RESULTS     (  )  DEXA SCAN                                          ( XX )  DIABETIC EYE EXAM            (  )  FOOT EXAM                                          (  )  ENTIRE RECORD     (  )  OUTSIDE IMMUNIZATIONS                 (  )  _______________         Please fax records to Galina Lester MD, [phone].     If you have any questions, please contact Annie Morrison LPN at [phone].           Patient Name: Stephania Rodgers  : 1960  Patient Phone #: [phone]

## 2022-08-01 ENCOUNTER — TELEPHONE (OUTPATIENT)
Dept: SURGERY | Facility: CLINIC | Age: 62
End: 2022-08-01
Payer: COMMERCIAL

## 2022-08-01 LAB
FINAL PATHOLOGIC DIAGNOSIS: NORMAL
GROSS: NORMAL
Lab: NORMAL

## 2022-08-01 NOTE — TELEPHONE ENCOUNTER
Patient returned call regarding results of breast biopsy from 7/25/2022, no atypia/benign, all questions answered, pt advised to follow up in 6 months with repeat imaging per core biopsy protocol. Dr. Herrera reviewed biopsy results and results are benign and concordant. Patient verbalized understanding.     Ghada Herrera MD   8/1/2022 10:40 AM CDT Back to Top        Benign and concordant.  Routine six month follow-up recommended.

## 2022-08-15 ENCOUNTER — PATIENT OUTREACH (OUTPATIENT)
Dept: ADMINISTRATIVE | Facility: HOSPITAL | Age: 62
End: 2022-08-15
Payer: COMMERCIAL

## 2022-08-15 NOTE — PROGRESS NOTES
Health Maintenance Due   Topic Date Due    Shingles Vaccine (1 of 2) Never done    Pneumococcal Vaccines (Age 0-64) (2 - PCV) 10/17/2018    COVID-19 Vaccine (4 - Booster for Pfizer series) 02/22/2022    Foot Exam  06/17/2022     Chart review done.  updated. Immunizations reviewed & updated. Care Everywhere updated.  Diabetic Eye Exam from 5/23/22 Scanned into chart.

## 2022-09-28 ENCOUNTER — IMMUNIZATION (OUTPATIENT)
Dept: PRIMARY CARE CLINIC | Facility: CLINIC | Age: 62
End: 2022-09-28
Payer: COMMERCIAL

## 2022-09-28 DIAGNOSIS — Z23 NEED FOR VACCINATION: Primary | ICD-10-CM

## 2022-09-28 PROCEDURE — 91312 COVID-19, MRNA, LNP-S, BIVALENT BOOSTER, PF, 30 MCG/0.3 ML DOSE: CPT | Mod: PBBFAC | Performed by: INTERNAL MEDICINE

## 2022-09-28 PROCEDURE — 0124A COVID-19, MRNA, LNP-S, BIVALENT BOOSTER, PF, 30 MCG/0.3 ML DOSE: CPT | Mod: CV19,PBBFAC | Performed by: INTERNAL MEDICINE

## 2022-10-12 ENCOUNTER — OFFICE VISIT (OUTPATIENT)
Dept: INTERNAL MEDICINE | Facility: CLINIC | Age: 62
End: 2022-10-12
Payer: COMMERCIAL

## 2022-10-12 VITALS
WEIGHT: 220.81 LBS | BODY MASS INDEX: 34.66 KG/M2 | OXYGEN SATURATION: 97 % | DIASTOLIC BLOOD PRESSURE: 82 MMHG | SYSTOLIC BLOOD PRESSURE: 120 MMHG | HEIGHT: 67 IN | HEART RATE: 74 BPM | TEMPERATURE: 98 F

## 2022-10-12 DIAGNOSIS — I15.2 HYPERTENSION ASSOCIATED WITH DIABETES: ICD-10-CM

## 2022-10-12 DIAGNOSIS — E11.59 HYPERTENSION ASSOCIATED WITH DIABETES: ICD-10-CM

## 2022-10-12 DIAGNOSIS — E11.9 TYPE 2 DIABETES MELLITUS WITHOUT COMPLICATION, WITHOUT LONG-TERM CURRENT USE OF INSULIN: ICD-10-CM

## 2022-10-12 DIAGNOSIS — E78.5 HYPERLIPIDEMIA ASSOCIATED WITH TYPE 2 DIABETES MELLITUS: ICD-10-CM

## 2022-10-12 DIAGNOSIS — F41.9 ANXIETY: ICD-10-CM

## 2022-10-12 DIAGNOSIS — E11.69 HYPERLIPIDEMIA ASSOCIATED WITH TYPE 2 DIABETES MELLITUS: ICD-10-CM

## 2022-10-12 DIAGNOSIS — E11.42 TYPE 2 DIABETES MELLITUS WITH DIABETIC POLYNEUROPATHY, WITHOUT LONG-TERM CURRENT USE OF INSULIN: ICD-10-CM

## 2022-10-12 PROBLEM — N17.9 AKI (ACUTE KIDNEY INJURY): Status: RESOLVED | Noted: 2022-06-02 | Resolved: 2022-10-12

## 2022-10-12 PROBLEM — K08.409 S/P TOOTH EXTRACTION: Status: RESOLVED | Noted: 2022-06-02 | Resolved: 2022-10-12

## 2022-10-12 PROBLEM — R55 SYNCOPE: Status: RESOLVED | Noted: 2022-06-02 | Resolved: 2022-10-12

## 2022-10-12 PROBLEM — D72.829 LEUKOCYTOSIS: Status: RESOLVED | Noted: 2022-06-02 | Resolved: 2022-10-12

## 2022-10-12 PROCEDURE — 90686 FLU VACCINE (QUAD) GREATER THAN OR EQUAL TO 3YO PRESERVATIVE FREE IM: ICD-10-PCS | Mod: S$GLB,,, | Performed by: INTERNAL MEDICINE

## 2022-10-12 PROCEDURE — 90677 PCV20 VACCINE IM: CPT | Mod: S$GLB,,, | Performed by: INTERNAL MEDICINE

## 2022-10-12 PROCEDURE — 90686 IIV4 VACC NO PRSV 0.5 ML IM: CPT | Mod: S$GLB,,, | Performed by: INTERNAL MEDICINE

## 2022-10-12 PROCEDURE — 90677 PNEUMOCOCCAL CONJUGATE VACCINE 20-VALENT: ICD-10-PCS | Mod: S$GLB,,, | Performed by: INTERNAL MEDICINE

## 2022-10-12 PROCEDURE — 90471 FLU VACCINE (QUAD) GREATER THAN OR EQUAL TO 3YO PRESERVATIVE FREE IM: ICD-10-PCS | Mod: S$GLB,,, | Performed by: INTERNAL MEDICINE

## 2022-10-12 PROCEDURE — 99999 PR PBB SHADOW E&M-EST. PATIENT-LVL III: ICD-10-PCS | Mod: PBBFAC,,, | Performed by: INTERNAL MEDICINE

## 2022-10-12 PROCEDURE — 99214 OFFICE O/P EST MOD 30 MIN: CPT | Mod: 25,S$GLB,, | Performed by: INTERNAL MEDICINE

## 2022-10-12 PROCEDURE — 90472 PNEUMOCOCCAL CONJUGATE VACCINE 20-VALENT: ICD-10-PCS | Mod: S$GLB,,, | Performed by: INTERNAL MEDICINE

## 2022-10-12 PROCEDURE — 99214 PR OFFICE/OUTPT VISIT, EST, LEVL IV, 30-39 MIN: ICD-10-PCS | Mod: 25,S$GLB,, | Performed by: INTERNAL MEDICINE

## 2022-10-12 PROCEDURE — 90472 IMMUNIZATION ADMIN EACH ADD: CPT | Mod: S$GLB,,, | Performed by: INTERNAL MEDICINE

## 2022-10-12 PROCEDURE — 99999 PR PBB SHADOW E&M-EST. PATIENT-LVL III: CPT | Mod: PBBFAC,,, | Performed by: INTERNAL MEDICINE

## 2022-10-12 PROCEDURE — 90471 IMMUNIZATION ADMIN: CPT | Mod: S$GLB,,, | Performed by: INTERNAL MEDICINE

## 2022-10-12 RX ORDER — LISINOPRIL AND HYDROCHLOROTHIAZIDE 12.5; 2 MG/1; MG/1
1 TABLET ORAL DAILY
Qty: 90 TABLET | Refills: 3 | Status: SHIPPED | OUTPATIENT
Start: 2022-10-12 | End: 2023-10-16 | Stop reason: SDUPTHER

## 2022-10-12 RX ORDER — SEMAGLUTIDE 1.34 MG/ML
1 INJECTION, SOLUTION SUBCUTANEOUS
Qty: 3 PEN | Refills: 3 | Status: SHIPPED | OUTPATIENT
Start: 2022-10-12 | End: 2023-10-16

## 2022-10-12 RX ORDER — ESCITALOPRAM OXALATE 10 MG/1
10 TABLET ORAL DAILY
Qty: 90 TABLET | Refills: 3 | Status: SHIPPED | OUTPATIENT
Start: 2022-10-12 | End: 2023-10-16 | Stop reason: SDUPTHER

## 2022-10-12 RX ORDER — GABAPENTIN 300 MG/1
300 CAPSULE ORAL 2 TIMES DAILY
Qty: 180 CAPSULE | Refills: 3 | Status: SHIPPED | OUTPATIENT
Start: 2022-10-12 | End: 2023-10-16 | Stop reason: SDUPTHER

## 2022-10-12 RX ORDER — ATORVASTATIN CALCIUM 40 MG/1
40 TABLET, FILM COATED ORAL NIGHTLY
Qty: 90 TABLET | Refills: 3 | Status: SHIPPED | OUTPATIENT
Start: 2022-10-12 | End: 2023-02-10 | Stop reason: SDUPTHER

## 2022-10-12 NOTE — ASSESSMENT & PLAN NOTE
A1C 7.2 in 4/2022  Stable on Ozempic 1 mg.  Had GI upset with metformin, did ok on victoza in past.  No hypoglycemia, takes neurontin for neuropathy.

## 2022-10-12 NOTE — PROGRESS NOTES
[facility] Note    Chief Complaint      Chief Complaint   Patient presents with    Follow-up       History of Present Illness       Stephania Rodgers is a 61 y.o. female who presents today for DM2 follow up.  Patient comes to appointment alone.    Problem List Items Addressed This Visit       Hypertension associated with diabetes    Current Assessment & Plan     BP controlled on lisinopril/HCTZ 20/12.5 mg daily, no CP/SOB/HA.         Relevant Medications    lisinopriL-hydrochlorothiazide (PRINZIDE,ZESTORETIC) 20-12.5 mg per tablet    semaglutide (OZEMPIC) 1 mg/dose (4 mg/3 mL)    Hyperlipidemia associated with type 2 diabetes mellitus    Current Assessment & Plan     Stable on lipitor 40 mg daily, no myalgias.  The 10-year ASCVD risk score (Reta JOY, et al., 2019) is: 14%    Values used to calculate the score:      Age: 61 years      Sex: Female      Is Non- : Yes      Diabetic: Yes      Tobacco smoker: No      Systolic Blood Pressure: 128 mmHg      Is BP treated: Yes      HDL Cholesterol: 55 mg/dL      Total Cholesterol: 155 mg/dL d           Relevant Medications    atorvastatin (LIPITOR) 40 MG tablet    semaglutide (OZEMPIC) 1 mg/dose (4 mg/3 mL)    Type 2 diabetes mellitus with diabetic polyneuropathy, without long-term current use of insulin    Current Assessment & Plan     A1C 7.2 in 4/2022  Stable on Ozempic 1 mg.  Had GI upset with metformin, did ok on victoza in past.  No hypoglycemia, takes neurontin for neuropathy.         Relevant Medications    gabapentin (NEURONTIN) 300 MG capsule    semaglutide (OZEMPIC) 1 mg/dose (4 mg/3 mL)    Anxiety    Current Assessment & Plan     Stable on lexapro 5 mg daily.  had stroke 7/21/2020,  Mom passed 9/2022.  Still working full time.  Has been taking xanax 0.5 mg PRN once daily, seems to help.           Relevant Medications    EScitalopram oxalate (LEXAPRO) 10 MG tablet       Health Maintenance   Topic Date Due     Hemoglobin A1c  10/05/2022    Low Dose Statin  04/04/2023    Lipid Panel  04/05/2023    Eye Exam  05/23/2023    Mammogram  07/25/2023    Foot Exam  10/12/2023    TETANUS VACCINE  05/17/2029    Hepatitis C Screening  Completed       Past Medical History:   Diagnosis Date    Diabetes mellitus     Hypertension        Past Surgical History:   Procedure Laterality Date    colonosocpy  colonoscopy    Polyp 2 mm in the descending colon. Repeat 5 years    FOOT SURGERY      FRACTURE SURGERY      HYSTERECTOMY      MANDIBLE FRACTURE SURGERY         family history includes Diabetes in her brother, father, and mother; Heart disease in her father; Hyperlipidemia in her father and mother; Hypertension in her brother, father, and mother.    Social History     Tobacco Use    Smoking status: Never    Smokeless tobacco: Never   Substance Use Topics    Alcohol use: Not Currently     Comment: socially    Drug use: Never       Review of Systems   Constitutional:  Negative for chills and fever.   HENT:  Negative for sore throat.    Respiratory:  Negative for cough and shortness of breath.    Cardiovascular:  Negative for chest pain and palpitations.   Gastrointestinal:  Negative for constipation, diarrhea, nausea and vomiting.   Genitourinary:  Negative for dysuria and hematuria.   Musculoskeletal:  Negative for falls.   Neurological:  Negative for headaches.      Outpatient Encounter Medications as of 10/12/2022   Medication Sig Dispense Refill    ALPRAZolam (XANAX) 0.25 MG tablet TAKE 1 TABLET BY MOUTH ONCE DAILY AS NEEDED FOR ANXIETY 30 tablet 1    aspirin (ECOTRIN) 81 MG EC tablet Take 81 mg by mouth once daily.      [DISCONTINUED] atorvastatin (LIPITOR) 40 MG tablet TAKE 1 TABLET BY MOUTH ONCE DAILY IN THE EVENING 30 tablet 0    [DISCONTINUED] EScitalopram oxalate (LEXAPRO) 5 MG Tab Take 1 tablet (5 mg total) by mouth once daily. 90 tablet 3    [DISCONTINUED] gabapentin (NEURONTIN) 300 MG capsule Take 1 capsule (300 mg total) by  "mouth 2 (two) times daily. 180 capsule 3    [DISCONTINUED] lisinopriL-hydrochlorothiazide (PRINZIDE,ZESTORETIC) 20-12.5 mg per tablet Take 1 tablet by mouth once daily. 90 tablet 3    [DISCONTINUED] semaglutide (OZEMPIC) 1 mg/dose (4 mg/3 mL) Inject 1 mg into the skin every 7 days. 3 pen 3    atorvastatin (LIPITOR) 40 MG tablet Take 1 tablet (40 mg total) by mouth every evening. 90 tablet 3    EScitalopram oxalate (LEXAPRO) 10 MG tablet Take 1 tablet (10 mg total) by mouth once daily. 90 tablet 3    gabapentin (NEURONTIN) 300 MG capsule Take 1 capsule (300 mg total) by mouth 2 (two) times daily. 180 capsule 3    HYDROCODONE-ACETAMINOPHEN ORAL Take by mouth.      lisinopriL-hydrochlorothiazide (PRINZIDE,ZESTORETIC) 20-12.5 mg per tablet Take 1 tablet by mouth once daily. 90 tablet 3    semaglutide (OZEMPIC) 1 mg/dose (4 mg/3 mL) Inject 1 mg into the skin every 7 days. 3 pen 3    [DISCONTINUED] AMOXICILLIN ORAL Take by mouth.       No facility-administered encounter medications on file as of 10/12/2022.        Review of patient's allergies indicates:  No Known Allergies    Physical Exam      Vital Signs  Temp: 97.8 °F (36.6 °C)  Pulse: 74  SpO2: 97 %  BP: 120/82  Pain Score: 0-No pain  Height and Weight  Height: 5' 7" (170.2 cm)  Weight: 100.1 kg (220 lb 12.7 oz)  BSA (Calculated - sq m): 2.18 sq meters  BMI (Calculated): 34.6  Weight in (lb) to have BMI = 25: 159.3]    Physical Exam  Constitutional:       Appearance: She is well-developed.   HENT:      Head: Normocephalic and atraumatic.      Right Ear: External ear normal.      Left Ear: External ear normal.   Eyes:      General:         Right eye: No discharge.         Left eye: No discharge.   Cardiovascular:      Rate and Rhythm: Normal rate and regular rhythm.      Pulses:           Dorsalis pedis pulses are 2+ on the right side and 2+ on the left side.      Heart sounds: Normal heart sounds. No murmur heard.  Pulmonary:      Effort: Pulmonary effort is normal. " No respiratory distress.      Breath sounds: Normal breath sounds.   Abdominal:      General: There is no distension.      Palpations: Abdomen is soft.      Tenderness: There is no abdominal tenderness. There is no guarding.   Musculoskeletal:         General: Normal range of motion.      Cervical back: Normal range of motion.      Right foot: Normal range of motion. No deformity.      Left foot: Normal range of motion. No deformity.   Feet:      Right foot:      Protective Sensation: 5 sites tested.  5 sites sensed.      Skin integrity: No ulcer or blister.      Left foot:      Protective Sensation: 5 sites tested.  5 sites sensed.      Skin integrity: No ulcer or blister.   Skin:     General: Skin is warm and dry.   Neurological:      Mental Status: She is alert and oriented to person, place, and time.   Psychiatric:         Behavior: Behavior normal.        Laboratory:  CBC:  No results for input(s): WBC, RBC, HGB, HCT, PLT, MCV, MCH, MCHC in the last 2160 hours.  CMP:  No results for input(s): GLU, CALCIUM, ALBUMIN, PROT, NA, K, CO2, CL, BUN, ALKPHOS, ALT, AST, BILITOT in the last 2160 hours.    Invalid input(s): CREATININ  URINALYSIS:  No results for input(s): COLORU, CLARITYU, SPECGRAV, PHUR, PROTEINUA, GLUCOSEU, BILIRUBINCON, BLOODU, WBCU, RBCU, BACTERIA, MUCUS, NITRITE, LEUKOCYTESUR, UROBILINOGEN, HYALINECASTS in the last 2160 hours.   LIPIDS:  No results for input(s): TSH, HDL, CHOL, TRIG, LDLCALC, CHOLHDL, NONHDLCHOL, TOTALCHOLEST in the last 2160 hours.  TSH:  No results for input(s): TSH in the last 2160 hours.  A1C:  No results for input(s): HGBA1C in the last 2160 hours.    Radiology:  No results found in the last 30 days.     Assessment/Plan     Stephania Rodgers is a 61 y.o.female with:    1. Type 2 diabetes mellitus without complication, without long-term current use of insulin  - gabapentin (NEURONTIN) 300 MG capsule; Take 1 capsule (300 mg total) by mouth 2 (two) times daily.  Dispense: 180  capsule; Refill: 3  - semaglutide (OZEMPIC) 1 mg/dose (4 mg/3 mL); Inject 1 mg into the skin every 7 days.  Dispense: 3 pen; Refill: 3  - Hemoglobin A1C; Future  - Comprehensive Metabolic Panel; Future  - CBC Auto Differential; Future  - CBC Auto Differential  - Comprehensive Metabolic Panel; Future  - Hemoglobin A1C; Future  - Pneumococcal Conjugate Vaccine (20 Valent) (IM)  - Comprehensive Metabolic Panel  - Hemoglobin A1C    2. Hypertension associated with diabetes  - lisinopriL-hydrochlorothiazide (PRINZIDE,ZESTORETIC) 20-12.5 mg per tablet; Take 1 tablet by mouth once daily.  Dispense: 90 tablet; Refill: 3    3. Hyperlipidemia associated with type 2 diabetes mellitus  - atorvastatin (LIPITOR) 40 MG tablet; Take 1 tablet (40 mg total) by mouth every evening.  Dispense: 90 tablet; Refill: 3    4. Anxiety  - EScitalopram oxalate (LEXAPRO) 10 MG tablet; Take 1 tablet (10 mg total) by mouth once daily.  Dispense: 90 tablet; Refill: 3    5. Type 2 diabetes mellitus with diabetic polyneuropathy, without long-term current use of insulin       -labs ASAP  -increase lexapro to 10 mg daily  -flu and prevnar 20 today  -Continue current medications and maintain follow up with specialists.    -Follow up in about 6 months (around 4/12/2023) for Annual Visit.       Galina Lester MD  [facility]

## 2022-10-12 NOTE — ASSESSMENT & PLAN NOTE
Stable on lexapro 5 mg daily.  had stroke 7/21/2020,  Mom passed 9/2022.  Still working full time.  Has been taking xanax 0.5 mg PRN once daily, seems to help.

## 2022-10-12 NOTE — ASSESSMENT & PLAN NOTE
Stable on lipitor 40 mg daily, no myalgias.  The 10-year ASCVD risk score (Reta JOY, et al., 2019) is: 14%    Values used to calculate the score:      Age: 61 years      Sex: Female      Is Non- : Yes      Diabetic: Yes      Tobacco smoker: No      Systolic Blood Pressure: 128 mmHg      Is BP treated: Yes      HDL Cholesterol: 55 mg/dL      Total Cholesterol: 155 mg/dL d

## 2022-10-18 LAB
ALBUMIN SERPL-MCNC: 4.7 G/DL (ref 3.8–4.8)
ALBUMIN/GLOB SERPL: 2 {RATIO} (ref 1.2–2.2)
ALP SERPL-CCNC: 128 IU/L (ref 44–121)
ALT SERPL-CCNC: 15 IU/L (ref 0–32)
AST SERPL-CCNC: 18 IU/L (ref 0–40)
BASOPHILS # BLD AUTO: 0 X10E3/UL (ref 0–0.2)
BASOPHILS NFR BLD AUTO: 1 %
BILIRUB SERPL-MCNC: 0.7 MG/DL (ref 0–1.2)
BUN SERPL-MCNC: 12 MG/DL (ref 8–27)
BUN/CREAT SERPL: 16 (ref 12–28)
CALCIUM SERPL-MCNC: 9.8 MG/DL (ref 8.7–10.3)
CHLORIDE SERPL-SCNC: 103 MMOL/L (ref 96–106)
CO2 SERPL-SCNC: 26 MMOL/L (ref 20–29)
CREAT SERPL-MCNC: 0.76 MG/DL (ref 0.57–1)
EOSINOPHIL # BLD AUTO: 0.1 X10E3/UL (ref 0–0.4)
EOSINOPHIL NFR BLD AUTO: 2 %
ERYTHROCYTE [DISTWIDTH] IN BLOOD BY AUTOMATED COUNT: 14.7 % (ref 11.7–15.4)
EST. GFR  (NO RACE VARIABLE): 89 ML/MIN/1.73
GLOBULIN SER CALC-MCNC: 2.4 G/DL (ref 1.5–4.5)
GLUCOSE SERPL-MCNC: 116 MG/DL (ref 70–99)
HBA1C MFR BLD: 6.2 % (ref 4.8–5.6)
HCT VFR BLD AUTO: 42.1 % (ref 34–46.6)
HGB BLD-MCNC: 13.7 G/DL (ref 11.1–15.9)
IMM GRANULOCYTES # BLD AUTO: 0 X10E3/UL (ref 0–0.1)
IMM GRANULOCYTES NFR BLD AUTO: 0 %
LYMPHOCYTES # BLD AUTO: 1.6 X10E3/UL (ref 0.7–3.1)
LYMPHOCYTES NFR BLD AUTO: 27 %
MCH RBC QN AUTO: 25.7 PG (ref 26.6–33)
MCHC RBC AUTO-ENTMCNC: 32.5 G/DL (ref 31.5–35.7)
MCV RBC AUTO: 79 FL (ref 79–97)
MONOCYTES # BLD AUTO: 0.5 X10E3/UL (ref 0.1–0.9)
MONOCYTES NFR BLD AUTO: 8 %
NEUTROPHILS # BLD AUTO: 3.8 X10E3/UL (ref 1.4–7)
NEUTROPHILS NFR BLD AUTO: 62 %
PLATELET # BLD AUTO: 353 X10E3/UL (ref 150–450)
POTASSIUM SERPL-SCNC: 4.3 MMOL/L (ref 3.5–5.2)
PROT SERPL-MCNC: 7.1 G/DL (ref 6–8.5)
RBC # BLD AUTO: 5.33 X10E6/UL (ref 3.77–5.28)
SODIUM SERPL-SCNC: 144 MMOL/L (ref 134–144)
WBC # BLD AUTO: 6.1 X10E3/UL (ref 3.4–10.8)

## 2023-04-12 ENCOUNTER — OFFICE VISIT (OUTPATIENT)
Dept: PRIMARY CARE CLINIC | Facility: CLINIC | Age: 63
End: 2023-04-12
Payer: COMMERCIAL

## 2023-04-12 DIAGNOSIS — I15.2 HYPERTENSION ASSOCIATED WITH DIABETES: ICD-10-CM

## 2023-04-12 DIAGNOSIS — E78.5 HYPERLIPIDEMIA ASSOCIATED WITH TYPE 2 DIABETES MELLITUS: ICD-10-CM

## 2023-04-12 DIAGNOSIS — F41.9 ANXIETY: ICD-10-CM

## 2023-04-12 DIAGNOSIS — E66.01 CLASS 2 SEVERE OBESITY DUE TO EXCESS CALORIES WITH SERIOUS COMORBIDITY AND BODY MASS INDEX (BMI) OF 39.0 TO 39.9 IN ADULT: ICD-10-CM

## 2023-04-12 DIAGNOSIS — E11.59 HYPERTENSION ASSOCIATED WITH DIABETES: ICD-10-CM

## 2023-04-12 DIAGNOSIS — E11.69 HYPERLIPIDEMIA ASSOCIATED WITH TYPE 2 DIABETES MELLITUS: ICD-10-CM

## 2023-04-12 DIAGNOSIS — E11.42 TYPE 2 DIABETES MELLITUS WITH DIABETIC POLYNEUROPATHY, WITHOUT LONG-TERM CURRENT USE OF INSULIN: ICD-10-CM

## 2023-04-12 PROBLEM — E66.812 CLASS 2 SEVERE OBESITY DUE TO EXCESS CALORIES WITH SERIOUS COMORBIDITY AND BODY MASS INDEX (BMI) OF 39.0 TO 39.9 IN ADULT: Status: ACTIVE | Noted: 2023-04-12

## 2023-04-12 PROCEDURE — 99999 PR PBB SHADOW E&M-EST. PATIENT-LVL IV: ICD-10-PCS | Mod: PBBFAC,,, | Performed by: INTERNAL MEDICINE

## 2023-04-12 PROCEDURE — 99214 PR OFFICE/OUTPT VISIT, EST, LEVL IV, 30-39 MIN: ICD-10-PCS | Mod: S$GLB,,, | Performed by: INTERNAL MEDICINE

## 2023-04-12 PROCEDURE — 99999 PR PBB SHADOW E&M-EST. PATIENT-LVL IV: CPT | Mod: PBBFAC,,, | Performed by: INTERNAL MEDICINE

## 2023-04-12 PROCEDURE — 99214 OFFICE O/P EST MOD 30 MIN: CPT | Mod: S$GLB,,, | Performed by: INTERNAL MEDICINE

## 2023-04-12 RX ORDER — ATORVASTATIN CALCIUM 40 MG/1
40 TABLET, FILM COATED ORAL NIGHTLY
Qty: 90 TABLET | Refills: 3 | Status: SHIPPED | OUTPATIENT
Start: 2023-04-12

## 2023-04-12 NOTE — ASSESSMENT & PLAN NOTE
A1C 6.2 in 10/2022  Stable on Ozempic 1 mg (missed last month).  Had GI upset with metformin, did ok on victoza in past.  No hypoglycemia, takes neurontin for neuropathy.

## 2023-04-12 NOTE — ASSESSMENT & PLAN NOTE
Stable on lexapro 10 mg daily.  had stroke 7/21/2020,  Mom passed 9/2022.  Still working full time.  Has been taking xanax 0.5 mg PRN.   97.9

## 2023-04-12 NOTE — ASSESSMENT & PLAN NOTE
Stable on lipitor 40 mg daily, no myalgias.  The 10-year ASCVD risk score (Reta JOY, et al., 2019) is: 16%    Values used to calculate the score:      Age: 62 years      Sex: Female      Is Non- : Yes      Diabetic: Yes      Tobacco smoker: No      Systolic Blood Pressure: 132 mmHg      Is BP treated: Yes      HDL Cholesterol: 55 mg/dL      Total Cholesterol: 155 mg/dL d

## 2023-04-12 NOTE — PROGRESS NOTES
[facility] Note    Chief Complaint      No chief complaint on file.      History of Present Illness       Stephania Rodgers is a 62 y.o. female who presents today for DM2 follow up.  Patient comes to appointment alone.  Optho: Leader, GYN: J Carlos, Cards: Alvaro  Problem List Items Addressed This Visit    None        Health Maintenance   Topic Date Due    Lipid Panel  04/05/2023    Hemoglobin A1c  04/17/2023    Eye Exam  05/23/2023    Mammogram  07/25/2023    Foot Exam  10/12/2023    Low Dose Statin  02/10/2024    TETANUS VACCINE  05/17/2029    Hepatitis C Screening  Completed       Past Medical History:   Diagnosis Date    Diabetes mellitus     Hypertension        Past Surgical History:   Procedure Laterality Date    colonosocpy  colonoscopy    Polyp 2 mm in the descending colon. Repeat 5 years    FOOT SURGERY      FRACTURE SURGERY      HYSTERECTOMY      MANDIBLE FRACTURE SURGERY         family history includes Diabetes in her brother, father, and mother; Heart disease in her father; Hyperlipidemia in her father and mother; Hypertension in her brother, father, and mother.    Social History     Tobacco Use    Smoking status: Never    Smokeless tobacco: Never   Substance Use Topics    Alcohol use: Not Currently     Comment: socially    Drug use: Never       Review of Systems   Constitutional:  Negative for chills and fever.   Respiratory:  Negative for cough and shortness of breath.    Cardiovascular:  Negative for chest pain and palpitations.   Gastrointestinal:  Negative for constipation, diarrhea, nausea and vomiting.   Genitourinary:  Negative for dysuria and hematuria.   Musculoskeletal:  Negative for falls.   Neurological:  Negative for headaches.      Outpatient Encounter Medications as of 4/12/2023   Medication Sig Dispense Refill    ALPRAZolam (XANAX) 0.25 MG tablet Take 1 tablet (0.25 mg total) by mouth daily as needed for Anxiety. 30 tablet 1    aspirin (ECOTRIN) 81 MG EC tablet Take 81  mg by mouth once daily.      atorvastatin (LIPITOR) 40 MG tablet Take 1 tablet (40 mg total) by mouth every evening. 90 tablet 0    EScitalopram oxalate (LEXAPRO) 10 MG tablet Take 1 tablet (10 mg total) by mouth once daily. 90 tablet 3    gabapentin (NEURONTIN) 300 MG capsule Take 1 capsule (300 mg total) by mouth 2 (two) times daily. 180 capsule 3    HYDROCODONE-ACETAMINOPHEN ORAL Take by mouth.      lisinopriL-hydrochlorothiazide (PRINZIDE,ZESTORETIC) 20-12.5 mg per tablet Take 1 tablet by mouth once daily. 90 tablet 3    semaglutide (OZEMPIC) 1 mg/dose (4 mg/3 mL) Inject 1 mg into the skin every 7 days. 3 pen 3     No facility-administered encounter medications on file as of 4/12/2023.        Review of patient's allergies indicates:  No Known Allergies    Physical Exam       ]    Physical Exam  Constitutional:       Appearance: She is well-developed.   HENT:      Head: Normocephalic and atraumatic.      Right Ear: External ear normal.      Left Ear: External ear normal.   Eyes:      General:         Right eye: No discharge.         Left eye: No discharge.   Cardiovascular:      Rate and Rhythm: Normal rate and regular rhythm.      Pulses:           Dorsalis pedis pulses are 2+ on the right side and 2+ on the left side.      Heart sounds: Normal heart sounds. No murmur heard.  Pulmonary:      Effort: Pulmonary effort is normal. No respiratory distress.      Breath sounds: Normal breath sounds.   Abdominal:      General: There is no distension.      Palpations: Abdomen is soft.      Tenderness: There is no abdominal tenderness. There is no guarding.   Musculoskeletal:         General: Normal range of motion.      Cervical back: Normal range of motion.      Right foot: Normal range of motion. No deformity.      Left foot: Normal range of motion. No deformity.   Feet:      Right foot:      Protective Sensation: 5 sites tested.  5 sites sensed.      Skin integrity: No ulcer or blister.      Left foot:      Protective  Sensation: 5 sites tested.  5 sites sensed.      Skin integrity: No ulcer or blister.   Skin:     General: Skin is warm and dry.   Neurological:      Mental Status: She is alert and oriented to person, place, and time. Mental status is at baseline.   Psychiatric:         Behavior: Behavior normal.        Laboratory:  CBC:  No results for input(s): WBC, RBC, HGB, HCT, PLT, MCV, MCH, MCHC in the last 2160 hours.  CMP:  No results for input(s): GLU, CALCIUM, ALBUMIN, PROT, NA, K, CO2, CL, BUN, ALKPHOS, ALT, AST, BILITOT in the last 2160 hours.    Invalid input(s): CREATININ  URINALYSIS:  No results for input(s): COLORU, CLARITYU, SPECGRAV, PHUR, PROTEINUA, GLUCOSEU, BILIRUBINCON, BLOODU, WBCU, RBCU, BACTERIA, MUCUS, NITRITE, LEUKOCYTESUR, UROBILINOGEN, HYALINECASTS in the last 2160 hours.   LIPIDS:  No results for input(s): TSH, HDL, CHOL, TRIG, LDLCALC, CHOLHDL, NONHDLCHOL, TOTALCHOLEST in the last 2160 hours.  TSH:  No results for input(s): TSH in the last 2160 hours.  A1C:  No results for input(s): HGBA1C in the last 2160 hours.    Radiology:  No results found in the last 30 days.     Assessment/Plan     Stephania Rodgers is a 62 y.o.female with:    There are no diagnoses linked to this encounter.       -labs ASAP  -increase lexapro to 10 mg daily  -flu and prevnar 20 today  -Continue current medications and maintain follow up with specialists.    -No follow-ups on file.       Galina Lester MD  [facility]

## 2023-04-29 NOTE — TELEPHONE ENCOUNTER
Care Due:                  Date            Visit Type   Department     Provider  --------------------------------------------------------------------------------                                EP -                              PRIMARY  Last Visit: 04-      CARE ([facility])   None Found     Galina Lester                              EP -                              PRIMARY  Next Visit: 10-      CARE ([facility])   None Found     Galina Lester                                                            Last  Test          Frequency    Reason                     Performed    Due Date  --------------------------------------------------------------------------------    HBA1C.......  6 months...  semaglutide..............  10-   04-    Lipid Panel.  12 months..  atorvastatin.............  04- 04-    [facility] Embedded Care Due Messages. Reference number: [id number].   4/29/2023 2:04:40 PM CDT

## 2023-05-01 RX ORDER — ALPRAZOLAM 0.25 MG/1
TABLET ORAL
Qty: 30 TABLET | Refills: 1 | Status: SHIPPED | OUTPATIENT
Start: 2023-05-01 | End: 2023-08-03

## 2023-08-03 RX ORDER — ALPRAZOLAM 0.25 MG/1
TABLET ORAL
Qty: 30 TABLET | Refills: 0 | Status: SHIPPED | OUTPATIENT
Start: 2023-08-03 | End: 2023-10-18

## 2023-08-03 NOTE — TELEPHONE ENCOUNTER
Care Due:                  Date            Visit Type   Department     Provider  --------------------------------------------------------------------------------                                EP -                              PRIMARY  Last Visit: 04-      CARE ([facility])   None Found     Galina Lester                              EP -                              PRIMARY  Next Visit: 10-      CARE ([facility])   None Found     Galina Lester                                                            Last  Test          Frequency    Reason                     Performed    Due Date  --------------------------------------------------------------------------------    CMP.........  12 months..  atorvastatin,              10-   10-                             lisinopriL-hydrochlorothi                             azide....................    HBA1C.......  6 months...  semaglutide..............  10-   04-    Lipid Panel.  12 months..  atorvastatin.............  04- 04-    [facility] Embedded Care Due Messages. Reference number: [id number].   8/03/2023 11:37:32 AM CDT

## 2023-08-17 ENCOUNTER — PATIENT MESSAGE (OUTPATIENT)
Dept: ADMINISTRATIVE | Facility: HOSPITAL | Age: 63
End: 2023-08-17
Payer: COMMERCIAL

## 2023-08-31 ENCOUNTER — PATIENT OUTREACH (OUTPATIENT)
Dept: ADMINISTRATIVE | Facility: HOSPITAL | Age: 63
End: 2023-08-31
Payer: COMMERCIAL

## 2023-08-31 DIAGNOSIS — Z12.31 ENCOUNTER FOR SCREENING MAMMOGRAM FOR MALIGNANT NEOPLASM OF BREAST: Primary | ICD-10-CM

## 2023-08-31 NOTE — PROGRESS NOTES
Chart reviewed.   Immunizations: Reconciled  Orders placed: Mammogram  Upcoming appts to satisfy [facility] topics: N/A  Outreached to patient. No answer, LM on voicemail.

## 2023-09-18 ENCOUNTER — TELEPHONE (OUTPATIENT)
Dept: PRIMARY CARE CLINIC | Facility: CLINIC | Age: 63
End: 2023-09-18
Payer: COMMERCIAL

## 2023-09-18 DIAGNOSIS — E11.42 TYPE 2 DIABETES MELLITUS WITH DIABETIC POLYNEUROPATHY, WITHOUT LONG-TERM CURRENT USE OF INSULIN: Primary | ICD-10-CM

## 2023-09-18 NOTE — TELEPHONE ENCOUNTER
----- Message from Andra Dc sent at 9/18/2023  1:26 PM CDT -----  Contact: Pt [phone]  Pt called in regards to lab orders pt is at [facility] no can the orders be sent over please advise.

## 2023-09-18 NOTE — TELEPHONE ENCOUNTER
----- Message from Francisca Morrison sent at 9/18/2023  1:34 PM CDT -----  Contact: [phone]  Pt is returning a call she just missed. Pt is at the [facility] on the [facility].              Thank you

## 2023-10-11 LAB
BASOPHILS # BLD AUTO: 0 X10E3/UL (ref 0–0.2)
BASOPHILS NFR BLD AUTO: 0 %
EOSINOPHIL # BLD AUTO: 0.1 X10E3/UL (ref 0–0.4)
EOSINOPHIL NFR BLD AUTO: 1 %
ERYTHROCYTE [DISTWIDTH] IN BLOOD BY AUTOMATED COUNT: 13.7 % (ref 11.7–15.4)
HCT VFR BLD AUTO: 40 % (ref 34–46.6)
HGB BLD-MCNC: 13.1 G/DL (ref 11.1–15.9)
IMM GRANULOCYTES # BLD AUTO: 0 X10E3/UL (ref 0–0.1)
IMM GRANULOCYTES NFR BLD AUTO: 0 %
LYMPHOCYTES # BLD AUTO: 1.7 X10E3/UL (ref 0.7–3.1)
LYMPHOCYTES NFR BLD AUTO: 25 %
MCH RBC QN AUTO: 25.6 PG (ref 26.6–33)
MCHC RBC AUTO-ENTMCNC: 32.8 G/DL (ref 31.5–35.7)
MCV RBC AUTO: 78 FL (ref 79–97)
MONOCYTES # BLD AUTO: 0.5 X10E3/UL (ref 0.1–0.9)
MONOCYTES NFR BLD AUTO: 7 %
NEUTROPHILS # BLD AUTO: 4.4 X10E3/UL (ref 1.4–7)
NEUTROPHILS NFR BLD AUTO: 67 %
PLATELET # BLD AUTO: 383 X10E3/UL (ref 150–450)
RBC # BLD AUTO: 5.12 X10E6/UL (ref 3.77–5.28)
WBC # BLD AUTO: 6.7 X10E3/UL (ref 3.4–10.8)

## 2023-10-16 ENCOUNTER — OFFICE VISIT (OUTPATIENT)
Dept: PRIMARY CARE CLINIC | Facility: CLINIC | Age: 63
End: 2023-10-16
Payer: COMMERCIAL

## 2023-10-16 VITALS
HEIGHT: 67 IN | DIASTOLIC BLOOD PRESSURE: 82 MMHG | SYSTOLIC BLOOD PRESSURE: 130 MMHG | BODY MASS INDEX: 38.96 KG/M2 | HEART RATE: 78 BPM | WEIGHT: 248.25 LBS | OXYGEN SATURATION: 100 %

## 2023-10-16 DIAGNOSIS — E78.5 HYPERLIPIDEMIA ASSOCIATED WITH TYPE 2 DIABETES MELLITUS: ICD-10-CM

## 2023-10-16 DIAGNOSIS — E11.9 TYPE 2 DIABETES MELLITUS WITHOUT COMPLICATION, WITHOUT LONG-TERM CURRENT USE OF INSULIN: ICD-10-CM

## 2023-10-16 DIAGNOSIS — E11.69 HYPERLIPIDEMIA ASSOCIATED WITH TYPE 2 DIABETES MELLITUS: ICD-10-CM

## 2023-10-16 DIAGNOSIS — E66.01 CLASS 2 SEVERE OBESITY DUE TO EXCESS CALORIES WITH SERIOUS COMORBIDITY AND BODY MASS INDEX (BMI) OF 38.0 TO 38.9 IN ADULT: ICD-10-CM

## 2023-10-16 DIAGNOSIS — E11.42 TYPE 2 DIABETES MELLITUS WITH DIABETIC POLYNEUROPATHY, WITHOUT LONG-TERM CURRENT USE OF INSULIN: ICD-10-CM

## 2023-10-16 DIAGNOSIS — I15.2 HYPERTENSION ASSOCIATED WITH DIABETES: ICD-10-CM

## 2023-10-16 DIAGNOSIS — Z00.00 ANNUAL PHYSICAL EXAM: Primary | ICD-10-CM

## 2023-10-16 DIAGNOSIS — F41.9 ANXIETY: ICD-10-CM

## 2023-10-16 DIAGNOSIS — E11.59 HYPERTENSION ASSOCIATED WITH DIABETES: ICD-10-CM

## 2023-10-16 PROCEDURE — 99214 OFFICE O/P EST MOD 30 MIN: CPT | Mod: S$GLB,,, | Performed by: INTERNAL MEDICINE

## 2023-10-16 PROCEDURE — 99214 PR OFFICE/OUTPT VISIT, EST, LEVL IV, 30-39 MIN: ICD-10-PCS | Mod: S$GLB,,, | Performed by: INTERNAL MEDICINE

## 2023-10-16 PROCEDURE — 99999 PR PBB SHADOW E&M-EST. PATIENT-LVL III: ICD-10-PCS | Mod: PBBFAC,,, | Performed by: INTERNAL MEDICINE

## 2023-10-16 PROCEDURE — 99999 PR PBB SHADOW E&M-EST. PATIENT-LVL III: CPT | Mod: PBBFAC,,, | Performed by: INTERNAL MEDICINE

## 2023-10-16 RX ORDER — ESCITALOPRAM OXALATE 10 MG/1
10 TABLET ORAL DAILY
Qty: 90 TABLET | Refills: 3 | Status: SHIPPED | OUTPATIENT
Start: 2023-10-16 | End: 2024-10-15

## 2023-10-16 RX ORDER — GABAPENTIN 300 MG/1
300 CAPSULE ORAL 2 TIMES DAILY
Qty: 180 CAPSULE | Refills: 3 | Status: SHIPPED | OUTPATIENT
Start: 2023-10-16

## 2023-10-16 RX ORDER — SEMAGLUTIDE 1.34 MG/ML
0.25 INJECTION, SOLUTION SUBCUTANEOUS
COMMUNITY

## 2023-10-16 RX ORDER — LISINOPRIL AND HYDROCHLOROTHIAZIDE 12.5; 2 MG/1; MG/1
1 TABLET ORAL DAILY
Qty: 90 TABLET | Refills: 3 | Status: SHIPPED | OUTPATIENT
Start: 2023-10-16

## 2023-10-16 NOTE — PROGRESS NOTES
[facility] Note    Chief Complaint      Chief Complaint   Patient presents with    Follow-up       History of Present Illness       Stephania Rodgers is a 62 y.o. female who presents today for DM2 follow up.  Patient comes to appointment alone.  Optho: Leader, GYN: J Carlos, Cards: Alvaro    Problem List Items Addressed This Visit       Hypertension associated with diabetes    Current Assessment & Plan     BP controlled on lisinopril/HCTZ 20/12.5 mg daily, no CP/SOB/HA.         Relevant Medications    semaglutide (OZEMPIC) 0.25 mg or 0.5 mg(2 mg/1.5 mL) pen injector    lisinopriL-hydrochlorothiazide (PRINZIDE,ZESTORETIC) 20-12.5 mg per tablet    Hyperlipidemia associated with type 2 diabetes mellitus    Current Assessment & Plan     Stable on lipitor 40 mg daily, no myalgias.  The 10-year ASCVD risk score (Reta JOY, et al., 2019) is: 15.4%    Values used to calculate the score:      Age: 62 years      Sex: Female      Is Non- : Yes      Diabetic: Yes      Tobacco smoker: No      Systolic Blood Pressure: 130 mmHg      Is BP treated: Yes      HDL Cholesterol: 55 mg/dL      Total Cholesterol: 155 mg/dL d           Relevant Medications    semaglutide (OZEMPIC) 0.25 mg or 0.5 mg(2 mg/1.5 mL) pen injector    Type 2 diabetes mellitus with diabetic polyneuropathy, without long-term current use of insulin    Current Assessment & Plan     A1C 6.2 in 10/2022.  Stable on Ozempic 1 mg (missed last month).  Had GI upset with metformin, did ok on victoza in past.  No hypoglycemia, takes neurontin for neuropathy.         Relevant Medications    semaglutide (OZEMPIC) 0.25 mg or 0.5 mg(2 mg/1.5 mL) pen injector    gabapentin (NEURONTIN) 300 MG capsule    Other Relevant Orders    Microalbumin/Creatinine Ratio, Urine    Hemoglobin A1C    Anxiety    Current Assessment & Plan     Stable on lexapro 10 mg daily.  had stroke 7/21/2020,  Mom passed 9/2022.  Still working full time.  Has been  taking xanax 0.5 mg PRN.         Relevant Medications    EScitalopram oxalate (LEXAPRO) 10 MG tablet    Class 2 severe obesity due to excess calories with serious comorbidity and body mass index (BMI) of 38.0 to 38.9 in adult    Current Assessment & Plan     Has not been exercising 2/2 heat, planning on getting outside now that weather is better.          Other Visit Diagnoses       Annual physical exam    -  Primary    Relevant Orders    CBC Auto Differential    Comprehensive Metabolic Panel    Lipid Panel    Type 2 diabetes mellitus without complication, without long-term current use of insulin        Relevant Medications    semaglutide (OZEMPIC) 0.25 mg or 0.5 mg(2 mg/1.5 mL) pen injector    gabapentin (NEURONTIN) 300 MG capsule              Health Maintenance   Topic Date Due    Shingles Vaccine (1 of 2) Never done    Lipid Panel  04/05/2023    Hemoglobin A1c  04/17/2023    Eye Exam  05/23/2023    Mammogram  07/25/2023    Low Dose Statin  04/12/2024    Foot Exam  04/12/2024    Colorectal Cancer Screening  11/15/2027    TETANUS VACCINE  05/17/2029    Hepatitis C Screening  Completed       Past Medical History:   Diagnosis Date    Diabetes mellitus     Hypertension        Past Surgical History:   Procedure Laterality Date    colonosocpy  colonoscopy    Polyp 2 mm in the descending colon. Repeat 5 years    FOOT SURGERY      FRACTURE SURGERY      HYSTERECTOMY      MANDIBLE FRACTURE SURGERY         family history includes Diabetes in her brother, father, and mother; Heart disease in her father; Hyperlipidemia in her father and mother; Hypertension in her brother, father, and mother.    Social History     Tobacco Use    Smoking status: Never    Smokeless tobacco: Never   Substance Use Topics    Alcohol use: Not Currently     Comment: socially    Drug use: Never       Review of Systems   Constitutional:  Negative for chills and fever.   Respiratory:  Negative for cough and shortness of breath.    Cardiovascular:   "Negative for chest pain and palpitations.   Gastrointestinal:  Negative for constipation, diarrhea, nausea and vomiting.   Genitourinary:  Negative for dysuria and hematuria.   Musculoskeletal:  Negative for falls.   Neurological:  Negative for headaches.        Outpatient Encounter Medications as of 10/16/2023   Medication Sig Dispense Refill    ALPRAZolam (XANAX) 0.25 MG tablet TAKE 1 TABLET BY MOUTH ONCE DAILY AS NEEDED FOR ANXIETY 30 tablet 0    aspirin (ECOTRIN) 81 MG EC tablet Take 81 mg by mouth once daily.      atorvastatin (LIPITOR) 40 MG tablet Take 1 tablet (40 mg total) by mouth every evening. 90 tablet 3    semaglutide (OZEMPIC) 0.25 mg or 0.5 mg(2 mg/1.5 mL) pen injector Inject 0.25 mg into the skin every 7 days.      [DISCONTINUED] EScitalopram oxalate (LEXAPRO) 10 MG tablet Take 1 tablet (10 mg total) by mouth once daily. 90 tablet 3    [DISCONTINUED] gabapentin (NEURONTIN) 300 MG capsule Take 1 capsule (300 mg total) by mouth 2 (two) times daily. 180 capsule 3    [DISCONTINUED] lisinopriL-hydrochlorothiazide (PRINZIDE,ZESTORETIC) 20-12.5 mg per tablet Take 1 tablet by mouth once daily. 90 tablet 3    EScitalopram oxalate (LEXAPRO) 10 MG tablet Take 1 tablet (10 mg total) by mouth once daily. 90 tablet 3    gabapentin (NEURONTIN) 300 MG capsule Take 1 capsule (300 mg total) by mouth 2 (two) times daily. 180 capsule 3    lisinopriL-hydrochlorothiazide (PRINZIDE,ZESTORETIC) 20-12.5 mg per tablet Take 1 tablet by mouth once daily. 90 tablet 3    [DISCONTINUED] HYDROCODONE-ACETAMINOPHEN ORAL Take by mouth.      [DISCONTINUED] semaglutide (OZEMPIC) 1 mg/dose (4 mg/3 mL) Inject 1 mg into the skin every 7 days. 3 pen 3     No facility-administered encounter medications on file as of 10/16/2023.        Review of patient's allergies indicates:  No Known Allergies    Physical Exam      Vital Signs  Pulse: 78  SpO2: 100 %  BP: 130/82  Pain Score: 0-No pain  Height and Weight  Height: 5' 7" (170.2 cm)  Weight: " "112.6 kg (248 lb 3.8 oz)  BSA (Calculated - sq m): 2.31 sq meters  BMI (Calculated): 38.9  Weight in (lb) to have BMI = 25: 159.3]    Physical Exam  Constitutional:       Appearance: She is well-developed.   HENT:      Head: Normocephalic and atraumatic.      Right Ear: External ear normal.      Left Ear: External ear normal.   Eyes:      General:         Right eye: No discharge.         Left eye: No discharge.   Cardiovascular:      Rate and Rhythm: Normal rate and regular rhythm.      Pulses:           Dorsalis pedis pulses are 2+ on the right side and 2+ on the left side.      Heart sounds: Normal heart sounds. No murmur heard.  Pulmonary:      Effort: Pulmonary effort is normal. No respiratory distress.      Breath sounds: Normal breath sounds.   Abdominal:      General: There is no distension.      Palpations: Abdomen is soft.      Tenderness: There is no abdominal tenderness. There is no guarding.   Musculoskeletal:         General: Normal range of motion.      Cervical back: Normal range of motion.      Right foot: Normal range of motion. No deformity.      Left foot: Normal range of motion. No deformity.   Feet:      Right foot:      Protective Sensation: 5 sites tested.  5 sites sensed.      Skin integrity: No ulcer or blister.      Left foot:      Protective Sensation: 5 sites tested.  5 sites sensed.      Skin integrity: No ulcer or blister.   Skin:     General: Skin is warm and dry.   Neurological:      Mental Status: She is alert and oriented to person, place, and time. Mental status is at baseline.   Psychiatric:         Behavior: Behavior normal.          Laboratory:  CBC:  Recent Labs   Lab Result Units 10/10/23  0934   WBC x10E3/uL 6.7   RBC x10E6/uL 5.12   Hemoglobin g/dL 13.1   Hematocrit % 40.0   Platelets x10E3/uL 383   MCV fL 78*   MCH pg 25.6*   MCHC g/dL 32.8     CMP:  No results for input(s): "GLU", "CALCIUM", "ALBUMIN", "PROT", "NA", "K", "CO2", "CL", "BUN", "ALKPHOS", "ALT", "AST", " ""BILITOT" in the last 2160 hours.    Invalid input(s): "CREATININ"  URINALYSIS:  No results for input(s): "COLORU", "CLARITYU", "SPECGRAV", "PHUR", "PROTEINUA", "GLUCOSEU", "BILIRUBINCON", "BLOODU", "WBCU", "RBCU", "BACTERIA", "MUCUS", "NITRITE", "LEUKOCYTESUR", "UROBILINOGEN", "HYALINECASTS" in the last 2160 hours.   LIPIDS:  No results for input(s): "TSH", "HDL", "CHOL", "TRIG", "LDLCALC", "CHOLHDL", "NONHDLCHOL", "TOTALCHOLEST" in the last 2160 hours.  TSH:  No results for input(s): "TSH" in the last 2160 hours.  A1C:  No results for input(s): "HGBA1C" in the last 2160 hours.    Radiology:  No results found in the last 30 days.     Assessment/Plan     Stephania Rodgers is a 62 y.o.female with:    1. Anxiety  - EScitalopram oxalate (LEXAPRO) 10 MG tablet; Take 1 tablet (10 mg total) by mouth once daily.  Dispense: 90 tablet; Refill: 3    2. Type 2 diabetes mellitus with diabetic polyneuropathy, without long-term current use of insulin  - Microalbumin/Creatinine Ratio, Urine; Future  - Hemoglobin A1C; Future    3. Hyperlipidemia associated with type 2 diabetes mellitus    4. Hypertension associated with diabetes  - lisinopriL-hydrochlorothiazide (PRINZIDE,ZESTORETIC) 20-12.5 mg per tablet; Take 1 tablet by mouth once daily.  Dispense: 90 tablet; Refill: 3    5. Annual physical exam  - CBC Auto Differential; Future  - Comprehensive Metabolic Panel; Future  - Lipid Panel; Future    6. Type 2 diabetes mellitus without complication, without long-term current use of insulin  - gabapentin (NEURONTIN) 300 MG capsule; Take 1 capsule (300 mg total) by mouth 2 (two) times daily.  Dispense: 180 capsule; Refill: 3    7. Class 2 severe obesity due to excess calories with serious comorbidity and body mass index (BMI) of 38.0 to 38.9 in adult         -labs ordered   -MMG ordered, in 8/2023, will schedule  -counseled on RSV and Shingrix  -Continue current medications and maintain follow up with specialists.    -Follow up in " about 6 months (around 4/16/2024) for follow up of medical problems.       Galina Lester MD  [facility], [place]

## 2023-10-16 NOTE — ASSESSMENT & PLAN NOTE
Stable on lexapro 10 mg daily.  had stroke 7/21/2020,  Mom passed 9/2022.  Still working full time.  Has been taking xanax 0.5 mg PRN.

## 2023-10-16 NOTE — ASSESSMENT & PLAN NOTE
A1C 6.2 in 10/2022.  Stable on Ozempic 1 mg (missed last month).  Had GI upset with metformin, did ok on victoza in past.  No hypoglycemia, takes neurontin for neuropathy.

## 2023-10-16 NOTE — ASSESSMENT & PLAN NOTE
Stable on lipitor 40 mg daily, no myalgias.  The 10-year ASCVD risk score (Reta JOY, et al., 2019) is: 15.4%    Values used to calculate the score:      Age: 62 years      Sex: Female      Is Non- : Yes      Diabetic: Yes      Tobacco smoker: No      Systolic Blood Pressure: 130 mmHg      Is BP treated: Yes      HDL Cholesterol: 55 mg/dL      Total Cholesterol: 155 mg/dL d

## 2023-10-17 ENCOUNTER — PATIENT MESSAGE (OUTPATIENT)
Dept: ADMINISTRATIVE | Facility: HOSPITAL | Age: 63
End: 2023-10-17
Payer: COMMERCIAL

## 2023-10-18 RX ORDER — ALPRAZOLAM 0.25 MG/1
TABLET ORAL
Qty: 30 TABLET | Refills: 0 | Status: SHIPPED | OUTPATIENT
Start: 2023-10-18 | End: 2023-11-27

## 2023-10-18 NOTE — TELEPHONE ENCOUNTER
Care Due:                  Date            Visit Type   Department     Provider  --------------------------------------------------------------------------------                                EP -                              PRIMARY      [facility]  Last Visit: 10-      CARE ([facility])   MELANIE Lester                              EP -                              PRIMARY      [facility]  Next Visit: 04-      CARE ([facility])   MELANIE Lester                                                            Last  Test          Frequency    Reason                     Performed    Due Date  --------------------------------------------------------------------------------    CMP.........  12 months..  atorvastatin,              10-   10-                             lisinopriL-hydrochlorothi                             azide....................    Lipid Panel.  12 months..  atorvastatin.............  04- 04-    [facility] Embedded Care Due Messages. Reference number: [id number].   10/18/2023 11:08:19 AM CDT

## 2023-10-20 ENCOUNTER — HOSPITAL ENCOUNTER (OUTPATIENT)
Dept: RADIOLOGY | Facility: HOSPITAL | Age: 63
Discharge: HOME | End: 2023-10-20
Attending: INTERNAL MEDICINE
Payer: COMMERCIAL

## 2023-10-20 VITALS — BODY MASS INDEX: 38.92 KG/M2 | HEIGHT: 67 IN | WEIGHT: 248 LBS

## 2023-10-20 DIAGNOSIS — Z12.31 ENCOUNTER FOR SCREENING MAMMOGRAM FOR MALIGNANT NEOPLASM OF BREAST: ICD-10-CM

## 2023-10-20 PROCEDURE — 77067 MAMMO DIGITAL SCREENING BILAT WITH TOMO: ICD-10-PCS | Mod: 26,,, | Performed by: RADIOLOGY

## 2023-10-20 PROCEDURE — 77067 SCR MAMMO BI INCL CAD: CPT | Mod: TC

## 2023-10-20 PROCEDURE — 77063 BREAST TOMOSYNTHESIS BI: CPT | Mod: 26,,, | Performed by: RADIOLOGY

## 2023-10-20 PROCEDURE — 77063 MAMMO DIGITAL SCREENING BILAT WITH TOMO: ICD-10-PCS | Mod: 26,,, | Performed by: RADIOLOGY

## 2023-10-20 PROCEDURE — 77067 SCR MAMMO BI INCL CAD: CPT | Mod: 26,,, | Performed by: RADIOLOGY

## 2023-11-25 NOTE — TELEPHONE ENCOUNTER
No care due was identified.  [facility] Embedded Care Due Messages. Reference number: [id number].   11/25/2023 1:15:10 PM CST

## 2023-11-27 RX ORDER — ALPRAZOLAM 0.25 MG/1
TABLET ORAL
Qty: 30 TABLET | Refills: 0 | OUTPATIENT
Start: 2023-11-27

## 2024-01-16 DIAGNOSIS — E11.9 TYPE 2 DIABETES MELLITUS WITHOUT COMPLICATION, UNSPECIFIED WHETHER LONG TERM INSULIN USE: ICD-10-CM

## 2024-04-03 DIAGNOSIS — E11.9 TYPE 2 DIABETES MELLITUS WITHOUT COMPLICATION, UNSPECIFIED WHETHER LONG TERM INSULIN USE: ICD-10-CM

## 2024-04-10 ENCOUNTER — PATIENT MESSAGE (OUTPATIENT)
Dept: ADMINISTRATIVE | Facility: HOSPITAL | Age: 64
End: 2024-04-10
Payer: COMMERCIAL

## 2024-04-17 ENCOUNTER — PATIENT OUTREACH (OUTPATIENT)
Dept: ADMINISTRATIVE | Facility: HOSPITAL | Age: 64
End: 2024-04-17
Payer: COMMERCIAL

## 2024-04-17 ENCOUNTER — OFFICE VISIT (OUTPATIENT)
Dept: PRIMARY CARE CLINIC | Facility: CLINIC | Age: 64
End: 2024-04-17
Payer: COMMERCIAL

## 2024-04-17 VITALS
HEART RATE: 92 BPM | DIASTOLIC BLOOD PRESSURE: 80 MMHG | WEIGHT: 248 LBS | BODY MASS INDEX: 38.92 KG/M2 | OXYGEN SATURATION: 98 % | HEIGHT: 67 IN | SYSTOLIC BLOOD PRESSURE: 124 MMHG

## 2024-04-17 DIAGNOSIS — E78.5 HYPERLIPIDEMIA ASSOCIATED WITH TYPE 2 DIABETES MELLITUS: ICD-10-CM

## 2024-04-17 DIAGNOSIS — I15.2 HYPERTENSION ASSOCIATED WITH DIABETES: ICD-10-CM

## 2024-04-17 DIAGNOSIS — E11.59 HYPERTENSION ASSOCIATED WITH DIABETES: ICD-10-CM

## 2024-04-17 DIAGNOSIS — Z12.31 ENCOUNTER FOR SCREENING MAMMOGRAM FOR MALIGNANT NEOPLASM OF BREAST: ICD-10-CM

## 2024-04-17 DIAGNOSIS — E11.69 HYPERLIPIDEMIA ASSOCIATED WITH TYPE 2 DIABETES MELLITUS: ICD-10-CM

## 2024-04-17 DIAGNOSIS — R00.2 PALPITATIONS: ICD-10-CM

## 2024-04-17 DIAGNOSIS — Z00.00 ANNUAL PHYSICAL EXAM: ICD-10-CM

## 2024-04-17 DIAGNOSIS — E66.01 CLASS 2 SEVERE OBESITY DUE TO EXCESS CALORIES WITH SERIOUS COMORBIDITY AND BODY MASS INDEX (BMI) OF 38.0 TO 38.9 IN ADULT: ICD-10-CM

## 2024-04-17 DIAGNOSIS — F41.9 ANXIETY: ICD-10-CM

## 2024-04-17 DIAGNOSIS — E11.42 TYPE 2 DIABETES MELLITUS WITH DIABETIC POLYNEUROPATHY, WITHOUT LONG-TERM CURRENT USE OF INSULIN: Primary | ICD-10-CM

## 2024-04-17 DIAGNOSIS — E11.9 TYPE 2 DIABETES MELLITUS WITHOUT COMPLICATION, WITHOUT LONG-TERM CURRENT USE OF INSULIN: ICD-10-CM

## 2024-04-17 PROCEDURE — 99214 OFFICE O/P EST MOD 30 MIN: CPT | Mod: S$GLB,,, | Performed by: INTERNAL MEDICINE

## 2024-04-17 PROCEDURE — 99999 PR PBB SHADOW E&M-EST. PATIENT-LVL III: CPT | Mod: PBBFAC,,, | Performed by: INTERNAL MEDICINE

## 2024-04-17 RX ORDER — ALPRAZOLAM 0.25 MG/1
0.25 TABLET ORAL 2 TIMES DAILY PRN
Qty: 30 TABLET | Refills: 5 | Status: SHIPPED | OUTPATIENT
Start: 2024-04-17

## 2024-04-17 RX ORDER — LISINOPRIL AND HYDROCHLOROTHIAZIDE 12.5; 2 MG/1; MG/1
1 TABLET ORAL DAILY
Qty: 90 TABLET | Refills: 3 | Status: SHIPPED | OUTPATIENT
Start: 2024-04-17

## 2024-04-17 RX ORDER — SEMAGLUTIDE 1.34 MG/ML
0.25 INJECTION, SOLUTION SUBCUTANEOUS
Qty: 1.5 ML | Refills: 3 | Status: SHIPPED | OUTPATIENT
Start: 2024-04-17

## 2024-04-17 RX ORDER — ESCITALOPRAM OXALATE 10 MG/1
10 TABLET ORAL DAILY
Qty: 90 TABLET | Refills: 3 | Status: SHIPPED | OUTPATIENT
Start: 2024-04-17 | End: 2025-04-17

## 2024-04-17 RX ORDER — GABAPENTIN 300 MG/1
300 CAPSULE ORAL 2 TIMES DAILY
Qty: 180 CAPSULE | Refills: 3 | Status: SHIPPED | OUTPATIENT
Start: 2024-04-17

## 2024-04-17 RX ORDER — ATORVASTATIN CALCIUM 40 MG/1
40 TABLET, FILM COATED ORAL NIGHTLY
Qty: 90 TABLET | Refills: 3 | Status: SHIPPED | OUTPATIENT
Start: 2024-04-17

## 2024-04-17 NOTE — LETTER
AUTHORIZATION FOR RELEASE OF   CONFIDENTIAL INFORMATION    Dear Dr. Neely,    We are seeing Stephania Rodgers, date of birth 1960, in the clinic at [facility]. Galina Lester MD is the patient's PCP. Stephania Rodgers has an outstanding lab/procedure at the time we reviewed her chart. In order to help keep her health information updated, she has authorized us to request the following medical record(s):        (  )  MAMMOGRAM                                      (  )  COLONOSCOPY      (  )  PAP SMEAR                                          (  )  OUTSIDE LAB RESULTS     (  )  DEXA SCAN                                          (X)  EYE EXAM            (  )  FOOT EXAM                                          (  )  ENTIRE RECORD     (  )  OUTSIDE IMMUNIZATIONS                 (  )  _______________         Please fax records to [facility], Jordan, Jenna C., MD, [phone].     If you have any questions, please contact Lyla at [phone].           Patient Name: Stephania Rodgers  : 1960  Patient Phone #: [phone]

## 2024-04-17 NOTE — ASSESSMENT & PLAN NOTE
Stable on lipitor 40 mg daily, no myalgias.  The 10-year ASCVD risk score (Reta JOY, et al., 2019) is: 8.7%    Values used to calculate the score:      Age: 63 years      Sex: Female      Is Non- : Yes      Diabetic: Yes      Tobacco smoker: No      Systolic Blood Pressure: 100 mmHg      Is BP treated: Yes      HDL Cholesterol: 55 mg/dL      Total Cholesterol: 155 mg/dL d

## 2024-04-17 NOTE — ASSESSMENT & PLAN NOTE
Stable on lexapro 10 mg daily.  had stroke 7/21/2020,  Mom passed 9/2022.  Still working full time.  Has been taking xanax 0.25 mg PRN.

## 2024-04-17 NOTE — PROGRESS NOTES
Health Maintenance Due   Topic Date Due    Shingles Vaccine (1 of 2) Never done    RSV Vaccine (Age 60+ and Pregnant patients) (1 - 1-dose 60+ series) Never done    Diabetes Urine Screening  04/05/2023    Lipid Panel  04/05/2023    Hemoglobin A1c  04/17/2023    Eye Exam  05/23/2023     Chart review completed.  Updated. Triggered Links. Immunizations reviewed and updated. Care Everywhere Updated. Care Team Updated.  Requested eye exam reports from Dr. Neely.

## 2024-04-17 NOTE — PROGRESS NOTES
[facility] Note    Chief Complaint      Chief Complaint   Patient presents with    Follow-up       History of Present Illness       Stephania Rodgers is a 63 y.o. female who presents today for DM2 follow up.  Patient comes to appointment alone.  Optho: Leader, GYN: J Carlos, Cards: Mohsen    Having nuclear stress next month.    Problem List Items Addressed This Visit       Anxiety    Current Assessment & Plan     Stable on lexapro 10 mg daily.  had stroke 7/21/2020,  Mom passed 9/2022.  Still working full time.  Has been taking xanax 0.25 mg PRN.         Relevant Medications    EScitalopram oxalate (LEXAPRO) 10 MG tablet    Class 2 severe obesity due to excess calories with serious comorbidity and body mass index (BMI) of 38.0 to 38.9 in adult    Current Assessment & Plan     Has not been exercising.         Hyperlipidemia associated with type 2 diabetes mellitus    Current Assessment & Plan     Stable on lipitor 40 mg daily, no myalgias.  The 10-year ASCVD risk score (Reta JOY, et al., 2019) is: 8.7%    Values used to calculate the score:      Age: 63 years      Sex: Female      Is Non- : Yes      Diabetic: Yes      Tobacco smoker: No      Systolic Blood Pressure: 100 mmHg      Is BP treated: Yes      HDL Cholesterol: 55 mg/dL      Total Cholesterol: 155 mg/dL d           Relevant Medications    atorvastatin (LIPITOR) 40 MG tablet    semaglutide (OZEMPIC) 0.25 mg or 0.5 mg(2 mg/1.5 mL) pen injector    Hypertension associated with diabetes    Current Assessment & Plan     BP controlled on lisinopril/HCTZ 20/12.5 mg daily, no CP/SOB/HA.         Relevant Medications    lisinopriL-hydrochlorothiazide (PRINZIDE,ZESTORETIC) 20-12.5 mg per tablet    semaglutide (OZEMPIC) 0.25 mg or 0.5 mg(2 mg/1.5 mL) pen injector    Palpitations    Current Assessment & Plan     Had holter monitor in 2020 with Dr. John, which showed some PVC's.  Has been doing better lately.          Type 2 diabetes mellitus with diabetic polyneuropathy, without long-term current use of insulin - Primary    Current Assessment & Plan     A1C 6.2 in 10/2022.  Stable on Ozempic 0.25 mg.  Had GI upset with metformin, did ok on victoza in past.  No hypoglycemia, takes neurontin for neuropathy.         Relevant Medications    gabapentin (NEURONTIN) 300 MG capsule    semaglutide (OZEMPIC) 0.25 mg or 0.5 mg(2 mg/1.5 mL) pen injector    Other Relevant Orders    Microalbumin/Creatinine Ratio, Urine    Hemoglobin A1C     Other Visit Diagnoses       Type 2 diabetes mellitus without complication, without long-term current use of insulin        Relevant Medications    gabapentin (NEURONTIN) 300 MG capsule    semaglutide (OZEMPIC) 0.25 mg or 0.5 mg(2 mg/1.5 mL) pen injector    Annual physical exam        Relevant Orders    CBC Auto Differential    Comprehensive Metabolic Panel    Lipid Panel    Encounter for screening mammogram for malignant neoplasm of breast        Relevant Orders    Mammo Digital Screening Bilat w/ Tim                Health Maintenance   Topic Date Due    Shingles Vaccine (1 of 2) Never done    Lipid Panel  04/05/2023    Hemoglobin A1c  04/17/2023    Eye Exam  05/23/2023    Low Dose Statin  10/16/2024    Mammogram  10/20/2024    Foot Exam  04/17/2025    Colorectal Cancer Screening  11/15/2027    TETANUS VACCINE  05/17/2029    Hepatitis C Screening  Completed       Past Medical History:   Diagnosis Date    Diabetes mellitus     Hypertension        Past Surgical History:   Procedure Laterality Date    BREAST BIOPSY Left 07/2022    benign stereotactic    colonosocpy  colonoscopy    Polyp 2 mm in the descending colon. Repeat 5 years    FOOT SURGERY      FRACTURE SURGERY      HYSTERECTOMY      MANDIBLE FRACTURE SURGERY         family history includes Diabetes in her brother, father, and mother; Heart disease in her father; Hyperlipidemia in her father and mother; Hypertension in her brother, father, and  mother.    Social History     Tobacco Use    Smoking status: Never    Smokeless tobacco: Never   Substance Use Topics    Alcohol use: Not Currently     Comment: socially    Drug use: Never       Review of Systems   Constitutional:  Negative for chills and fever.   Respiratory:  Negative for cough and shortness of breath.    Cardiovascular:  Negative for chest pain and palpitations.   Gastrointestinal:  Negative for constipation, diarrhea, nausea and vomiting.   Genitourinary:  Negative for dysuria and hematuria.   Musculoskeletal:  Negative for falls.   Neurological:  Negative for headaches.        Outpatient Encounter Medications as of 4/17/2024   Medication Sig Dispense Refill    aspirin (ECOTRIN) 81 MG EC tablet Take 81 mg by mouth once daily.      [DISCONTINUED] ALPRAZolam (XANAX) 0.25 MG tablet TAKE 1 TABLET BY MOUTH ONCE DAILY AS NEEDED FOR ANXIETY 30 tablet 5    [DISCONTINUED] atorvastatin (LIPITOR) 40 MG tablet Take 1 tablet (40 mg total) by mouth every evening. 90 tablet 3    [DISCONTINUED] EScitalopram oxalate (LEXAPRO) 10 MG tablet Take 1 tablet (10 mg total) by mouth once daily. 90 tablet 3    [DISCONTINUED] gabapentin (NEURONTIN) 300 MG capsule Take 1 capsule (300 mg total) by mouth 2 (two) times daily. 180 capsule 3    [DISCONTINUED] lisinopriL-hydrochlorothiazide (PRINZIDE,ZESTORETIC) 20-12.5 mg per tablet Take 1 tablet by mouth once daily. 90 tablet 3    [DISCONTINUED] semaglutide (OZEMPIC) 0.25 mg or 0.5 mg(2 mg/1.5 mL) pen injector Inject 0.25 mg into the skin every 7 days.      ALPRAZolam (XANAX) 0.25 MG tablet Take 1 tablet (0.25 mg total) by mouth 2 (two) times daily as needed for Anxiety. 30 tablet 5    atorvastatin (LIPITOR) 40 MG tablet Take 1 tablet (40 mg total) by mouth every evening. 90 tablet 3    EScitalopram oxalate (LEXAPRO) 10 MG tablet Take 1 tablet (10 mg total) by mouth once daily. 90 tablet 3    gabapentin (NEURONTIN) 300 MG capsule Take 1 capsule (300 mg total) by mouth 2 (two)  "times daily. 180 capsule 3    lisinopriL-hydrochlorothiazide (PRINZIDE,ZESTORETIC) 20-12.5 mg per tablet Take 1 tablet by mouth once daily. 90 tablet 3    semaglutide (OZEMPIC) 0.25 mg or 0.5 mg(2 mg/1.5 mL) pen injector Inject 0.25 mg into the skin every 7 days. 1.5 mL 3     No facility-administered encounter medications on file as of 4/17/2024.        Review of patient's allergies indicates:  No Known Allergies    Physical Exam      Vital Signs  Pulse: 92  SpO2: 98 %  BP: 124/80  Pain Score: 0-No pain  Height and Weight  Height: 5' 7" (170.2 cm)  Weight: 112.5 kg (248 lb 0.3 oz)  BSA (Calculated - sq m): 2.31 sq meters  BMI (Calculated): 38.8  Weight in (lb) to have BMI = 25: 159.3]    Physical Exam  Constitutional:       Appearance: She is well-developed.   HENT:      Head: Normocephalic and atraumatic.   Cardiovascular:      Rate and Rhythm: Normal rate and regular rhythm.      Heart sounds: Normal heart sounds. No murmur heard.  Pulmonary:      Effort: Pulmonary effort is normal. No respiratory distress.      Breath sounds: Normal breath sounds.   Abdominal:      General: There is no distension.      Palpations: Abdomen is soft.      Tenderness: There is no abdominal tenderness. There is no guarding.   Skin:     General: Skin is warm and dry.   Neurological:      Mental Status: She is alert. Mental status is at baseline.   Psychiatric:         Behavior: Behavior normal.          Laboratory:  CBC:  No results for input(s): "WBC", "RBC", "HGB", "HCT", "PLT", "MCV", "MCH", "MCHC" in the last 2160 hours.    CMP:  No results for input(s): "GLU", "CALCIUM", "ALBUMIN", "PROT", "NA", "K", "CO2", "CL", "BUN", "ALKPHOS", "ALT", "AST", "BILITOT" in the last 2160 hours.    Invalid input(s): "CREATININ"  URINALYSIS:  No results for input(s): "COLORU", "CLARITYU", "SPECGRAV", "PHUR", "PROTEINUA", "GLUCOSEU", "BILIRUBINCON", "BLOODU", "WBCU", "RBCU", "BACTERIA", "MUCUS", "NITRITE", "LEUKOCYTESUR", "UROBILINOGEN", " ""HYALINECASTS" in the last 2160 hours.   LIPIDS:  No results for input(s): "TSH", "HDL", "CHOL", "TRIG", "LDLCALC", "CHOLHDL", "NONHDLCHOL", "TOTALCHOLEST" in the last 2160 hours.  TSH:  No results for input(s): "TSH" in the last 2160 hours.  A1C:  No results for input(s): "HGBA1C" in the last 2160 hours.    Radiology:  No results found in the last 30 days.     Assessment/Plan     Stephania Rodgers is a 63 y.o.female with:    1. Type 2 diabetes mellitus with diabetic polyneuropathy, without long-term current use of insulin  - Microalbumin/Creatinine Ratio, Urine; Future  - Hemoglobin A1C; Future    2. Hyperlipidemia associated with type 2 diabetes mellitus  - atorvastatin (LIPITOR) 40 MG tablet; Take 1 tablet (40 mg total) by mouth every evening.  Dispense: 90 tablet; Refill: 3    3. Hypertension associated with diabetes  - lisinopriL-hydrochlorothiazide (PRINZIDE,ZESTORETIC) 20-12.5 mg per tablet; Take 1 tablet by mouth once daily.  Dispense: 90 tablet; Refill: 3    4. Anxiety  - EScitalopram oxalate (LEXAPRO) 10 MG tablet; Take 1 tablet (10 mg total) by mouth once daily.  Dispense: 90 tablet; Refill: 3    5. Palpitations    6. Class 2 severe obesity due to excess calories with serious comorbidity and body mass index (BMI) of 38.0 to 38.9 in adult    7. Type 2 diabetes mellitus without complication, without long-term current use of insulin  - gabapentin (NEURONTIN) 300 MG capsule; Take 1 capsule (300 mg total) by mouth 2 (two) times daily.  Dispense: 180 capsule; Refill: 3    8. Annual physical exam  - CBC Auto Differential; Future  - Comprehensive Metabolic Panel; Future  - Lipid Panel; Future    9. Encounter for screening mammogram for malignant neoplasm of breast  - Mammo Digital Screening Bilat w/ Tim; Future           -labs ordered   -get eye exam from Dr. Neely  -counseled on RSV  -Continue current medications and maintain follow up with specialists.    -Follow up in about 6 months (around 10/17/2024) " for Annual Visit.       Galina Lester MD  [facility]

## 2024-04-17 NOTE — ASSESSMENT & PLAN NOTE
A1C 6.2 in 10/2022.  Stable on Ozempic 0.25 mg.  Had GI upset with metformin, did ok on victoza in past.  No hypoglycemia, takes neurontin for neuropathy.

## 2024-07-06 NOTE — TELEPHONE ENCOUNTER
Care Due:                  Date            Visit Type   Department     Provider  --------------------------------------------------------------------------------                                EP -                              PRIMARY      [facility]  Last Visit: 04-      CARE ([facility])   MELANIE Lester  Next Visit: None Scheduled  None         None Found                                                            Last  Test          Frequency    Reason                     Performed    Due Date  --------------------------------------------------------------------------------    CMP.........  12 months..  atorvastatin,              10-   10-                             lisinopriL-hydrochlorothi                             azide....................    HBA1C.......  6 months...  semaglutide..............  10-   04-    Lipid Panel.  12 months..  atorvastatin.............  04- 04-    [facility] Embedded Care Due Messages. Reference number: [id number].   7/06/2024 3:35:37 PM CDT

## 2024-07-08 RX ORDER — ALPRAZOLAM 0.25 MG/1
TABLET ORAL
Qty: 30 TABLET | Refills: 0 | Status: SHIPPED | OUTPATIENT
Start: 2024-07-08

## 2024-08-19 RX ORDER — ALPRAZOLAM 0.25 MG/1
TABLET ORAL
Qty: 30 TABLET | Refills: 0 | Status: SHIPPED | OUTPATIENT
Start: 2024-08-19

## 2024-08-19 NOTE — TELEPHONE ENCOUNTER
No care due was identified.  Health [facility] Embedded Care Due Messages. Reference number: [id number].   8/19/2024 1:02:17 PM CDT

## 2024-09-29 NOTE — TELEPHONE ENCOUNTER
Care Due:                  Date            Visit Type   Department     Provider  --------------------------------------------------------------------------------                                EP -                              PRIMARY      [facility]  Last Visit: 04-      CARE ([facility])   MELANIE Lester                              EP -                              PRIMARY      [facility] PR[facility]  Next Visit: 10-      CARE ([facility])   MELANIE Lester                                                            Last  Test          Frequency    Reason                     Performed    Due Date  --------------------------------------------------------------------------------    CMP.........  12 months..  atorvastatin,              10-   10-                             lisinopriL-hydrochlorothi                             azide....................    HBA1C.......  6 months...  semaglutide..............  10-   04-    Lipid Panel.  12 months..  atorvastatin.............  04- 04-    [facility] Embedded Care Due Messages. Reference number: [id number].   9/28/2024 7:56:37 PM CDT

## 2024-09-30 ENCOUNTER — TELEPHONE (OUTPATIENT)
Dept: PRIMARY CARE CLINIC | Facility: CLINIC | Age: 64
End: 2024-09-30
Payer: COMMERCIAL

## 2024-09-30 RX ORDER — ALPRAZOLAM 0.25 MG/1
TABLET ORAL
Qty: 30 TABLET | Refills: 0 | Status: SHIPPED | OUTPATIENT
Start: 2024-09-30

## 2024-09-30 NOTE — TELEPHONE ENCOUNTER
----- Message from Randy sent at 9/30/2024  9:12 AM CDT -----  Contact: [phone]  Requesting an RX refill or new RX.    Chelsey has sent request.    Is this a refill or new RX: refill    RX name and strength (copy/paste from chart):  ALPRAZolam (XANAX) 0.25 MG tablet    Is this a 30 day or 90 day RX: 30    Pharmacy name and phone # (copy/paste from chart):    [facility] [address] [place] [address]  [address] [place] [id number]  Phone: [phone] Fax: [phone]      The doctors have asked that we provide their patients with the following 2 reminders -- prescription refills can take up to 72 hours, and a friendly reminder that in the future you can use your [facility] account to request refills: y

## 2024-10-14 ENCOUNTER — TELEPHONE (OUTPATIENT)
Dept: PRIMARY CARE CLINIC | Facility: CLINIC | Age: 64
End: 2024-10-14
Payer: COMMERCIAL

## 2024-10-14 NOTE — TELEPHONE ENCOUNTER
----- Message from Joyce sent at 10/14/2024  8:39 AM CDT -----  Contact: Self/ [phone]  Type: Orders Request    What orders/ testing are being requested? 6 month labs    Is there a future appointment scheduled for the patient with PCP?Yes     When?10/14     Would you prefer a response via [email or web address]?call back     Comments:

## 2024-10-16 ENCOUNTER — PATIENT OUTREACH (OUTPATIENT)
Dept: ADMINISTRATIVE | Facility: HOSPITAL | Age: 64
End: 2024-10-16
Payer: COMMERCIAL

## 2024-10-16 ENCOUNTER — OFFICE VISIT (OUTPATIENT)
Dept: PRIMARY CARE CLINIC | Facility: CLINIC | Age: 64
End: 2024-10-16
Payer: COMMERCIAL

## 2024-10-16 VITALS
BODY MASS INDEX: 39.28 KG/M2 | HEART RATE: 77 BPM | OXYGEN SATURATION: 99 % | SYSTOLIC BLOOD PRESSURE: 126 MMHG | WEIGHT: 250.25 LBS | DIASTOLIC BLOOD PRESSURE: 84 MMHG | HEIGHT: 67 IN

## 2024-10-16 DIAGNOSIS — E66.812 CLASS 2 SEVERE OBESITY DUE TO EXCESS CALORIES WITH SERIOUS COMORBIDITY AND BODY MASS INDEX (BMI) OF 39.0 TO 39.9 IN ADULT: ICD-10-CM

## 2024-10-16 DIAGNOSIS — E11.69 HYPERLIPIDEMIA ASSOCIATED WITH TYPE 2 DIABETES MELLITUS: Primary | ICD-10-CM

## 2024-10-16 DIAGNOSIS — I15.2 HYPERTENSION ASSOCIATED WITH DIABETES: ICD-10-CM

## 2024-10-16 DIAGNOSIS — E78.5 HYPERLIPIDEMIA ASSOCIATED WITH TYPE 2 DIABETES MELLITUS: Primary | ICD-10-CM

## 2024-10-16 DIAGNOSIS — R00.2 PALPITATIONS: ICD-10-CM

## 2024-10-16 DIAGNOSIS — E11.42 TYPE 2 DIABETES MELLITUS WITH DIABETIC POLYNEUROPATHY, WITHOUT LONG-TERM CURRENT USE OF INSULIN: ICD-10-CM

## 2024-10-16 DIAGNOSIS — E11.59 HYPERTENSION ASSOCIATED WITH DIABETES: ICD-10-CM

## 2024-10-16 DIAGNOSIS — F41.9 ANXIETY: ICD-10-CM

## 2024-10-16 DIAGNOSIS — E66.01 CLASS 2 SEVERE OBESITY DUE TO EXCESS CALORIES WITH SERIOUS COMORBIDITY AND BODY MASS INDEX (BMI) OF 39.0 TO 39.9 IN ADULT: ICD-10-CM

## 2024-10-16 LAB
ALBUMIN SERPL-MCNC: 4.4 G/DL (ref 3.9–4.9)
ALBUMIN/CREAT UR: 7 MG/G CREAT (ref 0–29)
ALP SERPL-CCNC: 144 IU/L (ref 44–121)
ALT SERPL-CCNC: 30 IU/L (ref 0–32)
AST SERPL-CCNC: 22 IU/L (ref 0–40)
BASOPHILS # BLD AUTO: 0 X10E3/UL (ref 0–0.2)
BASOPHILS NFR BLD AUTO: 0 %
BILIRUB SERPL-MCNC: 0.6 MG/DL (ref 0–1.2)
BUN SERPL-MCNC: 10 MG/DL (ref 8–27)
BUN/CREAT SERPL: 12 (ref 12–28)
CALCIUM SERPL-MCNC: 9.5 MG/DL (ref 8.7–10.3)
CHLORIDE SERPL-SCNC: 103 MMOL/L (ref 96–106)
CHOLEST SERPL-MCNC: 178 MG/DL (ref 100–199)
CO2 SERPL-SCNC: 24 MMOL/L (ref 20–29)
CREAT SERPL-MCNC: 0.84 MG/DL (ref 0.57–1)
CREAT UR-MCNC: 169.4 MG/DL
EOSINOPHIL # BLD AUTO: 0.1 X10E3/UL (ref 0–0.4)
EOSINOPHIL NFR BLD AUTO: 1 %
ERYTHROCYTE [DISTWIDTH] IN BLOOD BY AUTOMATED COUNT: 14.8 % (ref 11.7–15.4)
EST. GFR  (NO RACE VARIABLE): 78 ML/MIN/1.73
GLOBULIN SER CALC-MCNC: 3.2 G/DL (ref 1.5–4.5)
GLUCOSE SERPL-MCNC: 153 MG/DL (ref 70–99)
HBA1C MFR BLD: 10.4 % (ref 4.8–5.6)
HCT VFR BLD AUTO: 41.7 % (ref 34–46.6)
HDLC SERPL-MCNC: 58 MG/DL
HGB BLD-MCNC: 13.2 G/DL (ref 11.1–15.9)
IMM GRANULOCYTES # BLD AUTO: 0 X10E3/UL (ref 0–0.1)
IMM GRANULOCYTES NFR BLD AUTO: 0 %
LDLC SERPL CALC-MCNC: 105 MG/DL (ref 0–99)
LYMPHOCYTES # BLD AUTO: 2.6 X10E3/UL (ref 0.7–3.1)
LYMPHOCYTES NFR BLD AUTO: 27 %
MCH RBC QN AUTO: 25.3 PG (ref 26.6–33)
MCHC RBC AUTO-ENTMCNC: 31.7 G/DL (ref 31.5–35.7)
MCV RBC AUTO: 80 FL (ref 79–97)
MICROALBUMIN UR-MCNC: 12.7 UG/ML
MONOCYTES # BLD AUTO: 0.6 X10E3/UL (ref 0.1–0.9)
MONOCYTES NFR BLD AUTO: 6 %
MORPHOLOGY BLD-IMP: ABNORMAL
NEUTROPHILS # BLD AUTO: 6.3 X10E3/UL (ref 1.4–7)
NEUTROPHILS NFR BLD AUTO: 66 %
PLATELET # BLD AUTO: 416 X10E3/UL (ref 150–450)
POTASSIUM SERPL-SCNC: 3.6 MMOL/L (ref 3.5–5.2)
PROT SERPL-MCNC: 7.6 G/DL (ref 6–8.5)
RBC # BLD AUTO: 5.21 X10E6/UL (ref 3.77–5.28)
SODIUM SERPL-SCNC: 146 MMOL/L (ref 134–144)
TRIGL SERPL-MCNC: 84 MG/DL (ref 0–149)
VLDLC SERPL CALC-MCNC: 15 MG/DL (ref 5–40)
WBC # BLD AUTO: 9.7 X10E3/UL (ref 3.4–10.8)

## 2024-10-16 PROCEDURE — 99999 PR PBB SHADOW E&M-EST. PATIENT-LVL III: CPT | Mod: PBBFAC,,, | Performed by: INTERNAL MEDICINE

## 2024-10-16 PROCEDURE — 99214 OFFICE O/P EST MOD 30 MIN: CPT | Mod: S$GLB,,, | Performed by: INTERNAL MEDICINE

## 2024-10-16 RX ORDER — ALPRAZOLAM 0.25 MG/1
0.25 TABLET ORAL DAILY PRN
Qty: 30 TABLET | Refills: 5 | Status: SHIPPED | OUTPATIENT
Start: 2024-10-16

## 2024-10-16 RX ORDER — SEMAGLUTIDE 0.68 MG/ML
0.5 INJECTION, SOLUTION SUBCUTANEOUS
Qty: 3 ML | Refills: 3 | Status: SHIPPED | OUTPATIENT
Start: 2024-10-16

## 2024-10-16 NOTE — PROGRESS NOTES
Health Maintenance Due   Topic Date Due    Shingles Vaccine (1 of 2) Never done    RSV Vaccine (Age 60+ and Pregnant patients) (1 - Risk 60-74 years 1-dose series) Never done    Diabetes Urine Screening  04/05/2023    Lipid Panel  04/05/2023    Hemoglobin A1c  04/17/2023    Eye Exam  05/23/2023    COVID-19 Vaccine (6 - 2024-25 season) 09/01/2024    Mammogram  10/20/2024     Chart review completed. HM Updated. Triggered Links. Immunizations reviewed and updated. Care Everywhere Updated. Care Team Updated.  Requested eye exam from Dr. Neely.

## 2024-10-16 NOTE — LETTER
AUTHORIZATION FOR RELEASE OF   CONFIDENTIAL INFORMATION    Dear Dr. Neely,    We are seeing Stephania Rodgers, date of birth 1960, in the clinic at [facility]. Galina Lester MD is the patient's PCP. Stephania Rodgers has an outstanding lab/procedure at the time we reviewed her chart. In order to help keep her health information updated, she has authorized us to request the following medical record(s):        (  )  MAMMOGRAM                                      (  )  COLONOSCOPY      (  )  PAP SMEAR                                          (  )  OUTSIDE LAB RESULTS     (  )  DEXA SCAN                                          (X)  DIABETIC EYE EXAM            (  )  FOOT EXAM                                          (  )  ENTIRE RECORD     (  )  OUTSIDE IMMUNIZATIONS                 (  )  _______________         Please fax records to [facility], Jordan, Jenna C., MD, [phone].     If you have any questions, please contact Lyla at [phone].           Patient Name: Stephania Rodgers  : 1960  Patient Phone #: [phone]

## 2024-10-16 NOTE — ASSESSMENT & PLAN NOTE
Stable on lipitor 40 mg daily, no myalgias.  The 10-year ASCVD risk score (Reta JOY, et al., 2019) is: 15.1%    Values used to calculate the score:      Age: 63 years      Sex: Female      Is Non- : Yes      Diabetic: Yes      Tobacco smoker: No      Systolic Blood Pressure: 126 mmHg      Is BP treated: Yes      HDL Cholesterol: 55 mg/dL      Total Cholesterol: 155 mg/dL d

## 2024-10-16 NOTE — PROGRESS NOTES
[facility] Note    Chief Complaint      Chief Complaint   Patient presents with    Follow-up       History of Present Illness       Stephania Rodgers is a 63 y.o. female who presents today for DM2 follow up.  Patient comes to appointment alone.  Optho: Leader, GYN: J Carlos, Cards: Mohsen      Problem List Items Addressed This Visit       Anxiety    Current Assessment & Plan     Stable on lexapro 10 mg daily.  had stroke 7/21/2020,  Mom passed 9/2022.  Still working full time.  Has been taking xanax 0.25 mg PRN.         Class 2 severe obesity due to excess calories with serious comorbidity and body mass index (BMI) of 39.0 to 39.9 in adult    Current Assessment & Plan     Has not been exercising.         Hyperlipidemia associated with type 2 diabetes mellitus - Primary    Current Assessment & Plan     Stable on lipitor 40 mg daily, no myalgias.  The 10-year ASCVD risk score (Reta JOY, et al., 2019) is: 15.1%    Values used to calculate the score:      Age: 63 years      Sex: Female      Is Non- : Yes      Diabetic: Yes      Tobacco smoker: No      Systolic Blood Pressure: 126 mmHg      Is BP treated: Yes      HDL Cholesterol: 55 mg/dL      Total Cholesterol: 155 mg/dL d           Relevant Medications    semaglutide (OZEMPIC) 0.25 mg or 0.5 mg (2 mg/3 mL) pen injector    Hypertension associated with diabetes    Current Assessment & Plan     BP controlled on lisinopril/HCTZ 20/12.5 mg daily, no CP/SOB/HA.         Relevant Medications    semaglutide (OZEMPIC) 0.25 mg or 0.5 mg (2 mg/3 mL) pen injector    Palpitations    Current Assessment & Plan     Had holter monitor in 2020 with Dr. John, which showed some PVC's.  Has been doing ok lately.         Type 2 diabetes mellitus with diabetic polyneuropathy, without long-term current use of insulin    Current Assessment & Plan     A1C 6.2 in 10/2022.  Stable on Ozempic 0.25 mg.  Had GI upset with metformin, did ok on  victoza in past.  No hypoglycemia, takes neurontin for neuropathy.         Relevant Medications    semaglutide (OZEMPIC) 0.25 mg or 0.5 mg (2 mg/3 mL) pen injector             Health Maintenance   Topic Date Due    Shingles Vaccine (1 of 2) Never done    Lipid Panel  04/05/2023    Hemoglobin A1c  04/17/2023    Eye Exam  05/23/2023    Mammogram  10/20/2024    Foot Exam  04/17/2025    Low Dose Statin  10/16/2025    Colorectal Cancer Screening  11/15/2027    TETANUS VACCINE  05/17/2029    Hepatitis C Screening  Completed       Past Medical History:   Diagnosis Date    Diabetes mellitus     Hypertension        Past Surgical History:   Procedure Laterality Date    BREAST BIOPSY Left 07/2022    benign stereotactic    colonosocpy  colonoscopy    Polyp 2 mm in the descending colon. Repeat 5 years    FOOT SURGERY      FRACTURE SURGERY      HYSTERECTOMY      MANDIBLE FRACTURE SURGERY         family history includes Diabetes in her brother, father, and mother; Heart disease in her father; Hyperlipidemia in her father and mother; Hypertension in her brother, father, and mother.    Social History     Tobacco Use    Smoking status: Never    Smokeless tobacco: Never   Substance Use Topics    Alcohol use: Not Currently     Comment: socially    Drug use: Never       Review of Systems   Constitutional:  Negative for chills and fever.   Respiratory:  Negative for cough and shortness of breath.    Cardiovascular:  Negative for chest pain and palpitations.   Gastrointestinal:  Negative for constipation, diarrhea, nausea and vomiting.   Genitourinary:  Negative for dysuria and hematuria.   Musculoskeletal:  Negative for falls.   Neurological:  Negative for headaches.        Outpatient Encounter Medications as of 10/16/2024   Medication Sig Dispense Refill    aspirin (ECOTRIN) 81 MG EC tablet Take 81 mg by mouth once daily.      atorvastatin (LIPITOR) 40 MG tablet Take 1 tablet (40 mg total) by mouth every evening.  "90 tablet 3    EScitalopram oxalate (LEXAPRO) 10 MG tablet Take 1 tablet (10 mg total) by mouth once daily. 90 tablet 3    gabapentin (NEURONTIN) 300 MG capsule Take 1 capsule (300 mg total) by mouth 2 (two) times daily. 180 capsule 3    lisinopriL-hydrochlorothiazide (PRINZIDE,ZESTORETIC) 20-12.5 mg per tablet Take 1 tablet by mouth once daily. 90 tablet 3    [DISCONTINUED] ALPRAZolam (XANAX) 0.25 MG tablet TAKE 1 TABLET BY MOUTH ONCE DAILY AS NEEDED FOR ANXIETY 30 tablet 0    [DISCONTINUED] semaglutide (OZEMPIC) 0.25 mg or 0.5 mg(2 mg/1.5 mL) pen injector Inject 0.25 mg into the skin every 7 days. 1.5 mL 3    ALPRAZolam (XANAX) 0.25 MG tablet Take 1 tablet (0.25 mg total) by mouth daily as needed for Anxiety. 30 tablet 5    semaglutide (OZEMPIC) 0.25 mg or 0.5 mg (2 mg/3 mL) pen injector Inject 0.5 mg into the skin every 7 days. 3 mL 3    [DISCONTINUED] ALPRAZolam (XANAX) 0.25 MG tablet TAKE 1 TABLET BY MOUTH ONCE DAILY AS NEEDED FOR ANXIETY 30 tablet 0     No facility-administered encounter medications on file as of 10/16/2024.        Review of patient's allergies indicates:  No Known Allergies    Physical Exam      Vital Signs  Pulse: 77  SpO2: 99 %  BP: 126/84  Pain Score: 0-No pain  Height and Weight  Height: 5' 7" (170.2 cm)  Weight: 113.5 kg (250 lb 3.6 oz)  BSA (Calculated - sq m): 2.32 sq meters  BMI (Calculated): 39.2  Weight in (lb) to have BMI = 25: 159.3]    Physical Exam  Constitutional:       Appearance: She is well-developed.   HENT:      Head: Normocephalic and atraumatic.   Cardiovascular:      Rate and Rhythm: Normal rate and regular rhythm.      Heart sounds: Normal heart sounds. No murmur heard.  Pulmonary:      Effort: Pulmonary effort is normal. No respiratory distress.      Breath sounds: Normal breath sounds.   Abdominal:      General: There is no distension.      Palpations: Abdomen is soft.      Tenderness: There is no abdominal tenderness. There is no guarding.   Skin:     " "General: Skin is warm and dry.   Neurological:      Mental Status: She is alert. Mental status is at baseline.   Psychiatric:         Behavior: Behavior normal.        Laboratory:  CBC:  No results for input(s): "WBC", "RBC", "HGB", "HCT", "PLT", "MCV", "MCH", "MCHC" in the last 2160 hours.    CMP:  No results for input(s): "GLU", "CALCIUM", "ALBUMIN", "PROT", "NA", "K", "CO2", "CL", "BUN", "ALKPHOS", "ALT", "AST", "BILITOT" in the last 2160 hours.    Invalid input(s): "CREATININ"  URINALYSIS:  No results for input(s): "COLORU", "CLARITYU", "SPECGRAV", "PHUR", "PROTEINUA", "GLUCOSEU", "BILIRUBINCON", "BLOODU", "WBCU", "RBCU", "BACTERIA", "MUCUS", "NITRITE", "LEUKOCYTESUR", "UROBILINOGEN", "HYALINECASTS" in the last 2160 hours.   LIPIDS:  No results for input(s): "TSH", "HDL", "CHOL", "TRIG", "LDLCALC", "CHOLHDL", "NONHDLCHOL", "TOTALCHOLEST" in the last 2160 hours.  TSH:  No results for input(s): "TSH" in the last 2160 hours.  A1C:  No results for input(s): "HGBA1C" in the last 2160 hours.    Radiology:  No results found in the last 30 days.     Assessment/Plan     Stephania Rodgers is a 63 y.o.female with:    1. Hyperlipidemia associated with type 2 diabetes mellitus    2. Hypertension associated with diabetes    3. Anxiety    4. Palpitations    5. Type 2 diabetes mellitus with diabetic polyneuropathy, without long-term current use of insulin    6. Class 2 severe obesity due to excess calories with serious comorbidity and body mass index (BMI) of 39.0 to 39.9 in adult             -labs ordered   -get eye exam from Dr. Neely  -counseled on RSV and Shingrix  -Continue current medications and maintain follow up with specialists.    -Follow up in about 6 months (around 4/16/2025) for follow up of medical problems.       Galina Lester MD  [facility]                            "

## 2024-10-17 ENCOUNTER — PATIENT OUTREACH (OUTPATIENT)
Dept: ADMINISTRATIVE | Facility: HOSPITAL | Age: 64
End: 2024-10-17
Payer: COMMERCIAL

## 2024-10-17 DIAGNOSIS — E11.42 TYPE 2 DIABETES MELLITUS WITH DIABETIC POLYNEUROPATHY, WITHOUT LONG-TERM CURRENT USE OF INSULIN: Primary | ICD-10-CM

## 2024-10-17 NOTE — PROGRESS NOTES
Health Maintenance Due   Topic Date Due    Shingles Vaccine (1 of 2) Never done    RSV Vaccine (Age 60+ and Pregnant patients) (1 - Risk 60-74 years 1-dose series) Never done    Eye Exam  05/23/2023    COVID-19 Vaccine (6 - 2024-25 season) 09/01/2024    Mammogram  10/20/2024     Chart review completed. HM Updated. Triggered Links. Immunizations reviewed and updated. Care Everywhere Updated. Care Team Updated.  Imported outside eye exam results from [facility] into /media. Diabetic retinopathy exam not performed.

## 2024-10-22 ENCOUNTER — TELEPHONE (OUTPATIENT)
Dept: PRIMARY CARE CLINIC | Facility: CLINIC | Age: 64
End: 2024-10-22
Payer: COMMERCIAL

## 2024-10-22 NOTE — TELEPHONE ENCOUNTER
----- Message from Shara sent at 10/22/2024  8:06 AM CDT -----  Contact: Ms. Montero Phone# [phone]  Ms. Montero from [facility] said that the patient Prior authorization for the Ozepmic have been denied again for the same reason, they did not have enough information in regards to the patient. Ms. Montero said that you can submit and appeal of a Prior authorization for another medication.

## 2024-10-26 ENCOUNTER — HOSPITAL ENCOUNTER (OUTPATIENT)
Dept: RADIOLOGY | Facility: HOSPITAL | Age: 64
Discharge: HOME | End: 2024-10-26
Attending: INTERNAL MEDICINE
Payer: COMMERCIAL

## 2024-10-26 VITALS — WEIGHT: 250 LBS | BODY MASS INDEX: 39.24 KG/M2 | HEIGHT: 67 IN

## 2024-10-26 DIAGNOSIS — Z12.31 ENCOUNTER FOR SCREENING MAMMOGRAM FOR MALIGNANT NEOPLASM OF BREAST: ICD-10-CM

## 2024-10-26 PROCEDURE — 77063 BREAST TOMOSYNTHESIS BI: CPT | Mod: TC

## 2024-10-26 PROCEDURE — 77063 BREAST TOMOSYNTHESIS BI: CPT | Mod: 26,,, | Performed by: RADIOLOGY

## 2024-10-26 PROCEDURE — 77067 SCR MAMMO BI INCL CAD: CPT | Mod: 26,,, | Performed by: RADIOLOGY

## 2024-10-30 ENCOUNTER — PATIENT MESSAGE (OUTPATIENT)
Dept: PRIMARY CARE CLINIC | Facility: CLINIC | Age: 64
End: 2024-10-30
Payer: COMMERCIAL

## 2024-10-30 ENCOUNTER — TELEPHONE (OUTPATIENT)
Dept: PRIMARY CARE CLINIC | Facility: CLINIC | Age: 64
End: 2024-10-30
Payer: COMMERCIAL

## 2024-11-06 ENCOUNTER — TELEPHONE (OUTPATIENT)
Dept: PRIMARY CARE CLINIC | Facility: CLINIC | Age: 64
End: 2024-11-06
Payer: COMMERCIAL

## 2024-11-06 NOTE — TELEPHONE ENCOUNTER
Pt advised of us accepting insurance. Advised to confirm network with insurance though just in case we are still out of network. Pt VU and will bring in new info/cards at upcoming visit.

## 2024-11-06 NOTE — TELEPHONE ENCOUNTER
----- Message from Randy sent at 11/6/2024  8:39 AM CST -----  Contact: [phone]  [email or web address]     Patient is calling for Medical Advice regarding: if office accepts BCBS Medicare with Plan G supplement    Patient wants a call back or thru [facility]: call    Comments:    Please advise patient replies from provider may take up to 48 hours.

## 2024-11-08 ENCOUNTER — PATIENT OUTREACH (OUTPATIENT)
Dept: ADMINISTRATIVE | Facility: HOSPITAL | Age: 64
End: 2024-11-08
Payer: COMMERCIAL

## 2024-11-08 NOTE — PROGRESS NOTES
Health Maintenance Due   Topic Date Due    Shingles Vaccine (1 of 2) Never done    RSV Vaccine (Age 60+ and Pregnant patients) (1 - Risk 60-74 years 1-dose series) Never done    Eye Exam  05/23/2023    COVID-19 Vaccine (6 - 2024-25 season) 09/01/2024     Chart review completed. HM Updated. Triggered Links. Immunizations reviewed and updated. Care Everywhere Updated. Care Team Updated.  Requested eye exam records from [facility].

## 2024-11-08 NOTE — LETTER
AUTHORIZATION FOR RELEASE OF   CONFIDENTIAL INFORMATION    Dear [facility],    We are seeing Stephania Rodgers, date of birth 1960, in the clinic at [facility]. Galina Lester MD is the patient's PCP. Stephania Rodgers has an outstanding lab/procedure at the time we reviewed her chart. In order to help keep her health information updated, she has authorized us to request the following medical record(s):        (  )  MAMMOGRAM                                      (  )  COLONOSCOPY      (  )  PAP SMEAR                                          (  )  OUTSIDE LAB RESULTS     (  )  DEXA SCAN                                          (X)  DIABETIC EYE EXAM            (  )  FOOT EXAM                                          (  )  ENTIRE RECORD     (  )  OUTSIDE IMMUNIZATIONS                 (  )  _______________         Please fax records to [facility], Jordan, Jenna C., MD, [phone].     If you have any questions, please contact Lyla at [phone].           Patient Name: Stephania Rodgers  : 1960  Patient Phone #: [phone]

## 2024-11-18 ENCOUNTER — PATIENT MESSAGE (OUTPATIENT)
Dept: PRIMARY CARE CLINIC | Facility: CLINIC | Age: 64
End: 2024-11-18
Payer: MEDICARE

## 2024-11-18 DIAGNOSIS — I15.2 HYPERTENSION ASSOCIATED WITH DIABETES: ICD-10-CM

## 2024-11-18 DIAGNOSIS — E11.59 HYPERTENSION ASSOCIATED WITH DIABETES: ICD-10-CM

## 2024-11-18 DIAGNOSIS — E11.9 TYPE 2 DIABETES MELLITUS WITHOUT COMPLICATION, WITHOUT LONG-TERM CURRENT USE OF INSULIN: ICD-10-CM

## 2024-11-18 DIAGNOSIS — F41.9 ANXIETY: ICD-10-CM

## 2024-11-19 RX ORDER — ESCITALOPRAM OXALATE 10 MG/1
10 TABLET ORAL DAILY
Qty: 90 TABLET | Refills: 3 | Status: SHIPPED | OUTPATIENT
Start: 2024-11-19

## 2024-11-19 RX ORDER — LISINOPRIL AND HYDROCHLOROTHIAZIDE 12.5; 2 MG/1; MG/1
1 TABLET ORAL DAILY
Qty: 90 TABLET | Refills: 3 | Status: SHIPPED | OUTPATIENT
Start: 2024-11-19

## 2024-11-19 RX ORDER — SEMAGLUTIDE 0.68 MG/ML
0.5 INJECTION, SOLUTION SUBCUTANEOUS
Qty: 9 ML | Refills: 1 | Status: SHIPPED | OUTPATIENT
Start: 2024-11-19

## 2024-11-19 RX ORDER — ALPRAZOLAM 0.25 MG/1
0.25 TABLET ORAL DAILY PRN
Qty: 30 TABLET | Refills: 5 | Status: SHIPPED | OUTPATIENT
Start: 2024-11-19

## 2024-11-19 RX ORDER — GABAPENTIN 300 MG/1
300 CAPSULE ORAL 2 TIMES DAILY
Qty: 180 CAPSULE | Refills: 3 | Status: SHIPPED | OUTPATIENT
Start: 2024-11-19

## 2024-11-19 NOTE — TELEPHONE ENCOUNTER
No care due was identified.  Health [facility] Embedded Care Due Messages. Reference number: [id number].   11/19/2024 8:19:07 AM CST

## 2024-11-19 NOTE — TELEPHONE ENCOUNTER
Refill Routing Note   Medication(s) are not appropriate for processing by [facility] for the following reason(s):        Outside of protocol  New or recently adjusted medication    [facility] action(s):  Defer  Route  Approve      Medication Therapy Plan: ALPRAZOLAM, GABAPENTIN-OOP; OZEMPIC-DEFER      Appointments  past 12m or future 3m with PCP    Date Provider   Last Visit   10/16/2024 Galina Lester MD   Next Visit   4/16/2025 Galina Lester MD   ED visits in past 90 days: 0        Note composed:11:03 AM 11/19/2024

## 2024-12-18 DIAGNOSIS — Z78.0 MENOPAUSE: ICD-10-CM

## 2025-01-17 ENCOUNTER — PATIENT OUTREACH (OUTPATIENT)
Dept: ADMINISTRATIVE | Facility: HOSPITAL | Age: 65
End: 2025-01-17
Payer: MEDICARE

## 2025-01-17 LAB — CRC RECOMMENDATION EXT: NORMAL

## 2025-01-17 NOTE — PROGRESS NOTES
Health Maintenance Due   Topic Date Due    DEXA Scan  Never done    Shingles Vaccine (1 of 2) Never done    RSV Vaccine (Age 60+ and Pregnant patients) (1 - Risk 60-74 years 1-dose series) Never done    Diabetic Eye Exam  05/23/2023    COVID-19 Vaccine (6 - 2024-25 season) 09/01/2024    Hemoglobin A1c  01/15/2025     Chart review completed.  Updated. Triggered Links. Immunizations reviewed and updated. Care Everywhere Updated. Care Team Updated.  Imported outside colonoscopy results from [email or web address] into /media.

## 2025-02-06 ENCOUNTER — PATIENT MESSAGE (OUTPATIENT)
Dept: ADMINISTRATIVE | Facility: CLINIC | Age: 65
End: 2025-02-06
Payer: MEDICARE

## 2025-02-26 DIAGNOSIS — E78.5 HYPERLIPIDEMIA ASSOCIATED WITH TYPE 2 DIABETES MELLITUS: ICD-10-CM

## 2025-02-26 DIAGNOSIS — E11.69 HYPERLIPIDEMIA ASSOCIATED WITH TYPE 2 DIABETES MELLITUS: ICD-10-CM

## 2025-02-26 RX ORDER — ATORVASTATIN CALCIUM 40 MG/1
40 TABLET, FILM COATED ORAL NIGHTLY
Qty: 90 TABLET | Refills: 3 | Status: SHIPPED | OUTPATIENT
Start: 2025-02-26

## 2025-02-26 NOTE — TELEPHONE ENCOUNTER
Care Due:                  Date            Visit Type   Department     Provider  --------------------------------------------------------------------------------                                EP -                              PRIMARY      [facility]  Last Visit: 10-      CARE ([facility])   CARE           Galina Lester                              EP -                              PRIMARY      [facility]  Next Visit: 04-      CARE ([facility])   CARE           Galina Lester                                                            Last  Test          Frequency    Reason                     Performed    Due Date  --------------------------------------------------------------------------------    HBA1C.......  6 months...  semaglutide..............  10-   04-    [employer] Embedded Care Due Messages. Reference number: [id number].   2/26/2025 11:02:45 AM CST

## 2025-03-21 ENCOUNTER — HOSPITAL ENCOUNTER (OUTPATIENT)
Dept: RADIOLOGY | Facility: HOSPITAL | Age: 65
Discharge: HOME | End: 2025-03-21
Attending: INTERNAL MEDICINE
Payer: MEDICARE

## 2025-03-21 DIAGNOSIS — Z78.0 MENOPAUSE: ICD-10-CM

## 2025-03-21 PROCEDURE — 77080 DXA BONE DENSITY AXIAL: CPT | Mod: 26,,, | Performed by: INTERNAL MEDICINE

## 2025-03-21 PROCEDURE — 77080 DXA BONE DENSITY AXIAL: CPT | Mod: TC

## 2025-03-25 ENCOUNTER — RESULTS FOLLOW-UP (OUTPATIENT)
Dept: PRIMARY CARE CLINIC | Facility: CLINIC | Age: 65
End: 2025-03-25

## 2025-03-31 ENCOUNTER — E-VISIT (OUTPATIENT)
Dept: PRIMARY CARE CLINIC | Facility: CLINIC | Age: 65
End: 2025-03-31
Payer: MEDICARE

## 2025-03-31 DIAGNOSIS — H92.09 OTALGIA, UNSPECIFIED LATERALITY: Primary | ICD-10-CM

## 2025-03-31 NOTE — PROGRESS NOTES
Patient ID: Stephania Rodgers is a 64 y.o. female.    Chief Complaint: General Illness (Entered automatically based on patient selection in [employer].)    The patient initiated a request through [employer] on 3/31/2025 for evaluation and management with a chief complaint of General Illness (Entered automatically based on patient selection in [employer].)     I evaluated the questionnaire submission on 3/31/2025.    [facility] P[facility] Evisit [facility]group-Common Problems    3/31/2025  3:40 PM CDT - Filed by Patient   What do you need help with? Other Concern   Do you agree to participate in an E-Visit? Yes   If you have any of the following symptoms, please go to the nearest emergency room or call 911: I acknowledge   What is the main issue you would like addressed today? My ears are clogged, or stopped like I was on an airplane.   Please describe your symptoms. Hearing is very low in both ears.   Where is your problem located? Ears   On a scale of 1-10, where 10 is the worst you can imagine, how severe are your symptoms? (range: 1 - 10) 9   Have you had these symptoms before? No   How long have you had these symptoms? A few days   What helps with your symptoms? I tried chewing gum but did work   What makes your symptoms feel worse? Sinuses   Are your symptoms related to a condition you currently have? No   Please describe any probable cause for your symptoms. Maybe sinus infection   Provide any additional information you feel is important.    Please attach any relevant images or files    Are you able to take your vital signs? No         No diagnosis found.     No orders of the defined types were placed in this encounter.           No follow-ups on file.      E-Visit Time Tracking:    Day 1 Time (in minutes): 5    Total Time (in minutes): 5

## 2025-04-16 ENCOUNTER — OFFICE VISIT (OUTPATIENT)
Dept: PRIMARY CARE CLINIC | Facility: CLINIC | Age: 65
End: 2025-04-16
Payer: MEDICARE

## 2025-04-16 ENCOUNTER — LAB VISIT (OUTPATIENT)
Dept: LAB | Facility: HOSPITAL | Age: 65
End: 2025-04-16
Attending: INTERNAL MEDICINE
Payer: MEDICARE

## 2025-04-16 VITALS
SYSTOLIC BLOOD PRESSURE: 132 MMHG | WEIGHT: 242.5 LBS | HEART RATE: 77 BPM | BODY MASS INDEX: 38.06 KG/M2 | HEIGHT: 67 IN | DIASTOLIC BLOOD PRESSURE: 84 MMHG | OXYGEN SATURATION: 98 %

## 2025-04-16 DIAGNOSIS — F41.9 ANXIETY: ICD-10-CM

## 2025-04-16 DIAGNOSIS — E78.5 HYPERLIPIDEMIA ASSOCIATED WITH TYPE 2 DIABETES MELLITUS: ICD-10-CM

## 2025-04-16 DIAGNOSIS — E11.59 HYPERTENSION ASSOCIATED WITH DIABETES: ICD-10-CM

## 2025-04-16 DIAGNOSIS — E11.42 TYPE 2 DIABETES MELLITUS WITH DIABETIC POLYNEUROPATHY, WITHOUT LONG-TERM CURRENT USE OF INSULIN: ICD-10-CM

## 2025-04-16 DIAGNOSIS — E11.42 TYPE 2 DIABETES MELLITUS WITH DIABETIC POLYNEUROPATHY, WITHOUT LONG-TERM CURRENT USE OF INSULIN: Primary | ICD-10-CM

## 2025-04-16 DIAGNOSIS — I15.2 HYPERTENSION ASSOCIATED WITH DIABETES: ICD-10-CM

## 2025-04-16 DIAGNOSIS — E11.69 HYPERLIPIDEMIA ASSOCIATED WITH TYPE 2 DIABETES MELLITUS: ICD-10-CM

## 2025-04-16 DIAGNOSIS — E66.01 CLASS 2 SEVERE OBESITY DUE TO EXCESS CALORIES WITH SERIOUS COMORBIDITY AND BODY MASS INDEX (BMI) OF 37.0 TO 37.9 IN ADULT: ICD-10-CM

## 2025-04-16 DIAGNOSIS — E11.9 TYPE 2 DIABETES MELLITUS WITHOUT COMPLICATION, WITHOUT LONG-TERM CURRENT USE OF INSULIN: ICD-10-CM

## 2025-04-16 DIAGNOSIS — E66.812 CLASS 2 SEVERE OBESITY DUE TO EXCESS CALORIES WITH SERIOUS COMORBIDITY AND BODY MASS INDEX (BMI) OF 37.0 TO 37.9 IN ADULT: ICD-10-CM

## 2025-04-16 LAB
EAG (OHS): 212 MG/DL (ref 68–131)
HBA1C MFR BLD: 9 % (ref 4–5.6)

## 2025-04-16 PROCEDURE — 83036 HEMOGLOBIN GLYCOSYLATED A1C: CPT

## 2025-04-16 PROCEDURE — 36415 COLL VENOUS BLD VENIPUNCTURE: CPT

## 2025-04-16 PROCEDURE — 99213 OFFICE O/P EST LOW 20 MIN: CPT | Mod: PBBFAC | Performed by: INTERNAL MEDICINE

## 2025-04-16 PROCEDURE — 99999 PR PBB SHADOW E&M-EST. PATIENT-LVL III: CPT | Mod: PBBFAC,,, | Performed by: INTERNAL MEDICINE

## 2025-04-16 RX ORDER — SEMAGLUTIDE 0.68 MG/ML
0.5 INJECTION, SOLUTION SUBCUTANEOUS
Qty: 9 ML | Refills: 1 | Status: SHIPPED | OUTPATIENT
Start: 2025-04-16

## 2025-04-16 NOTE — ASSESSMENT & PLAN NOTE
Stable on lipitor 40 mg daily, no myalgias.  The 10-year ASCVD risk score (eRta JOY, et al., 2019) is: 14.8%    Values used to calculate the score:      Age: 64 years      Sex: Female      Is Non- : Yes      Diabetic: Yes      Tobacco smoker: No      Systolic Blood Pressure: 116 mmHg      Is BP treated: Yes      HDL Cholesterol: 58 mg/dL      Total Cholesterol: 178 mg/dL d

## 2025-04-16 NOTE — ASSESSMENT & PLAN NOTE
A1C 10.4 in 10/2024.  Stable on Ozempic 0.5 mg. Only taking shot 50% of the time.    Had GI upset with metformin, did ok on victoza in past.  No hypoglycemia, takes neurontin for neuropathy.

## 2025-04-16 NOTE — PROGRESS NOTES
[facility] Note    Chief Complaint      Chief Complaint   Patient presents with    Follow-up       History of Present Illness       Stephania Rodgers is a 64 y.o. female who presents today for DM2 follow up.  Patient comes to appointment alone.  Optho: Leader, GYN: J Carlos, Cards: Mohsen    Goes to bed at 11/12 AM then wakes up at 4 AM.    Problem List Items Addressed This Visit       Anxiety    Current Assessment & Plan   Stable on lexapro 10 mg daily.  had stroke 7/21/2020,  Mom passed 9/2022.  Still working full time.  Has been taking xanax 0.25 mg PRN.         Class 2 severe obesity due to excess calories with serious comorbidity and body mass index (BMI) of 37.0 to 37.9 in adult    Current Assessment & Plan   Has not been exercising.  Lost 8 pounds since last visit.         Hyperlipidemia associated with type 2 diabetes mellitus    Current Assessment & Plan   Stable on lipitor 40 mg daily, no myalgias.  The 10-year ASCVD risk score (Reta JOY, et al., 2019) is: 14.8%    Values used to calculate the score:      Age: 64 years      Sex: Female      Is Non- : Yes      Diabetic: Yes      Tobacco smoker: No      Systolic Blood Pressure: 116 mmHg      Is BP treated: Yes      HDL Cholesterol: 58 mg/dL      Total Cholesterol: 178 mg/dL d           Relevant Medications    semaglutide (OZEMPIC) 0.25 mg or 0.5 mg (2 mg/3 mL) pen injector    Hypertension associated with diabetes    Current Assessment & Plan   BP controlled on lisinopril/HCTZ 20/12.5 mg daily, no CP/SOB/HA.         Relevant Medications    semaglutide (OZEMPIC) 0.25 mg or 0.5 mg (2 mg/3 mL) pen injector    Type 2 diabetes mellitus with diabetic polyneuropathy, without long-term current use of insulin - Primary    Current Assessment & Plan   A1C 10.4 in 10/2024.  Stable on Ozempic 0.5 mg. Only taking shot 50% of the time.    Had GI upset with metformin, did ok on victoza in past.  No hypoglycemia, takes neurontin  for neuropathy.         Relevant Medications    semaglutide (OZEMPIC) 0.25 mg or 0.5 mg (2 mg/3 mL) pen injector    Other Relevant Orders    Hemoglobin A1C     Other Visit Diagnoses         Type 2 diabetes mellitus without complication, without long-term current use of insulin        Relevant Medications    semaglutide (OZEMPIC) 0.25 mg or 0.5 mg (2 mg/3 mL) pen injector                    Health Maintenance   Topic Date Due    Shingles Vaccine (1 of 2) Never done    RSV Vaccine (Age 60+ and Pregnant patients) (1 - Risk 60-74 years 1-dose series) Never done    Diabetic Eye Exam  05/23/2023    COVID-19 Vaccine (6 - 2024-25 season) 09/01/2024    Hemoglobin A1c  01/15/2025    Diabetes Urine Screening  10/15/2025    Lipid Panel  10/15/2025    Mammogram  10/26/2025    Low Dose Statin  04/16/2026    Foot Exam  04/16/2026    TETANUS VACCINE  05/17/2029    Colorectal Cancer Screening  01/17/2030    DEXA Scan  03/21/2030    Hepatitis C Screening  Completed    Influenza Vaccine  Completed    HIV Screening  Completed    Pneumococcal Vaccines (Age 50+)  Completed       Past Medical History:   Diagnosis Date    Diabetes mellitus     Hypertension        Past Surgical History:   Procedure Laterality Date    BREAST BIOPSY Left 07/2022    benign stereotactic    colonosocpy  colonoscopy    Polyp 2 mm in the descending colon. Repeat 5 years    FOOT SURGERY      FRACTURE SURGERY      HYSTERECTOMY      MANDIBLE FRACTURE SURGERY         family history includes Diabetes in her brother, brother, father, and mother; Heart disease in her father; Hyperlipidemia in her father and mother; Hypertension in her brother, father, and mother; Prostate cancer in her brother.    Social History     Tobacco Use    Smoking status: Never    Smokeless tobacco: Never   Substance Use Topics    Alcohol use: Not Currently     Comment: socially    Drug use: Never       Review of Systems   Constitutional:  Negative for chills and fever.   Respiratory:  Negative  "for cough and shortness of breath.    Cardiovascular:  Negative for chest pain and palpitations.   Gastrointestinal:  Negative for constipation, diarrhea, nausea and vomiting.   Genitourinary:  Negative for dysuria and hematuria.   Musculoskeletal:  Negative for falls.   Neurological:  Negative for headaches.        Outpatient Encounter Medications as of 4/16/2025   Medication Sig Dispense Refill    ALPRAZolam (XANAX) 0.25 MG tablet Take 1 tablet (0.25 mg total) by mouth daily as needed for Anxiety. 30 tablet 5    aspirin (ECOTRIN) 81 MG EC tablet Take 81 mg by mouth once daily.      atorvastatin (LIPITOR) 40 MG tablet Take 1 tablet (40 mg total) by mouth every evening. 90 tablet 3    EScitalopram oxalate (LEXAPRO) 10 MG tablet Take 1 tablet (10 mg total) by mouth once daily. 90 tablet 3    gabapentin (NEURONTIN) 300 MG capsule Take 1 capsule (300 mg total) by mouth 2 (two) times daily. 180 capsule 3    lisinopriL-hydrochlorothiazide (PRINZIDE,ZESTORETIC) 20-12.5 mg per tablet Take 1 tablet by mouth once daily. 90 tablet 3    [DISCONTINUED] semaglutide (OZEMPIC) 0.25 mg or 0.5 mg (2 mg/3 mL) pen injector Inject 0.5 mg into the skin every 7 days. 9 mL 1    semaglutide (OZEMPIC) 0.25 mg or 0.5 mg (2 mg/3 mL) pen injector Inject 0.5 mg into the skin every 7 days. 9 mL 1     No facility-administered encounter medications on file as of 4/16/2025.        Review of patient's allergies indicates:  No Known Allergies    Physical Exam      Vital Signs  Pulse: 77  SpO2: 98 %  BP: 132/84  Pain Score: 0-No pain  Height and Weight  Height: 5' 7" (170.2 cm)  Weight: 110 kg (242 lb 8.1 oz)  BSA (Calculated - sq m): 2.28 sq meters  BMI (Calculated): 38  Weight in (lb) to have BMI = 25: 159.3]    Physical Exam  Constitutional:       Appearance: She is well-developed.   HENT:      Head: Normocephalic and atraumatic.   Cardiovascular:      Rate and Rhythm: Normal rate and regular rhythm.      Heart sounds: Normal heart sounds. No " "murmur heard.  Pulmonary:      Effort: Pulmonary effort is normal. No respiratory distress.      Breath sounds: Normal breath sounds.   Abdominal:      General: There is no distension.      Palpations: Abdomen is soft.      Tenderness: There is no abdominal tenderness. There is no guarding.   Skin:     General: Skin is warm and dry.   Neurological:      Mental Status: She is alert. Mental status is at baseline.   Psychiatric:         Behavior: Behavior normal.          Laboratory:  CBC:  No results for input(s): "WBC", "RBC", "HGB", "HCT", "PLT", "MCV", "MCH", "MCHC" in the last 2160 hours.    CMP:  No results for input(s): "GLU", "CALCIUM", "ALBUMIN", "PROT", "NA", "K", "CO2", "CL", "BUN", "ALKPHOS", "ALT", "AST", "BILITOT" in the last 2160 hours.    Invalid input(s): "CREATININ"  URINALYSIS:  No results for input(s): "COLORU", "CLARITYU", "SPECGRAV", "PHUR", "PROTEINUA", "GLUCOSEU", "BILIRUBINCON", "BLOODU", "WBCU", "RBCU", "BACTERIA", "MUCUS", "NITRITE", "LEUKOCYTESUR", "UROBILINOGEN", "HYALINECASTS" in the last 2160 hours.   LIPIDS:  No results for input(s): "TSH", "HDL", "CHOL", "TRIG", "LDLCALC", "CHOLHDL", "NONHDLCHOL", "TOTALCHOLEST" in the last 2160 hours.  TSH:  No results for input(s): "TSH" in the last 2160 hours.  A1C:  No results for input(s): "HGBA1C" in the last 2160 hours.    Radiology:  No results found in the last 30 days.     Assessment/Plan     Stephania Rodgers is a 64 y.o.female with:    1. Type 2 diabetes mellitus with diabetic polyneuropathy, without long-term current use of insulin  - Hemoglobin A1C; Future    2. Hypertension associated with diabetes    3. Hyperlipidemia associated with type 2 diabetes mellitus    4. Anxiety    5. Class 2 severe obesity due to excess calories with serious comorbidity and body mass index (BMI) of 37.0 to 37.9 in adult    6. Type 2 diabetes mellitus without complication, without long-term current use of insulin  - semaglutide (OZEMPIC) 0.25 mg or 0.5 mg (2 " mg/3 mL) pen injector; Inject 0.5 mg into the skin every 7 days.  Dispense: 9 mL; Refill: 1               -labs ordered   -get eye exam from Dr. Neely  -counseled on RSV and Shingrix  -Continue current medications and maintain follow up with specialists.    -No follow-ups on file.       Galina Lester MD  [facility]

## 2025-04-17 ENCOUNTER — RESULTS FOLLOW-UP (OUTPATIENT)
Dept: PRIMARY CARE CLINIC | Facility: CLINIC | Age: 65
End: 2025-04-17

## 2025-05-01 DIAGNOSIS — E11.9 TYPE 2 DIABETES MELLITUS WITHOUT COMPLICATION, UNSPECIFIED WHETHER LONG TERM INSULIN USE: ICD-10-CM

## 2025-05-08 ENCOUNTER — PATIENT OUTREACH (OUTPATIENT)
Dept: ADMINISTRATIVE | Facility: HOSPITAL | Age: 65
End: 2025-05-08
Payer: MEDICARE

## 2025-05-08 NOTE — LETTER
AUTHORIZATION FOR RELEASE OF   CONFIDENTIAL INFORMATION    Dear [facility],    We are seeing Stephania Rodgers, date of birth 1960, in the clinic at [facility]. Galina Lester MD is the patient's PCP. Stephania Rodgers has an outstanding lab/procedure at the time we reviewed her chart. In order to help keep her health information updated, she has authorized us to request the following medical record(s):        (  )  MAMMOGRAM                                      (  )  COLONOSCOPY      (  )  PAP SMEAR                                          (  )  OUTSIDE LAB RESULTS     (  )  DEXA SCAN                                          ( X)  EYE EXAM            (  )  FOOT EXAM                                          (  )  ENTIRE RECORD     (  )  OUTSIDE IMMUNIZATIONS                 (  )  _______________         Please fax records to Galina Lester MD,  at [phone] or email to [email or web address].       If you have any questions, please contact Sobeida Dolan LPN at [phone].          Patient Name: Stephania Rodgers  : 1960  Patient Phone #: [phone]

## 2025-05-08 NOTE — PROGRESS NOTES
Records requested from [facility] for diabetic eye exam.  Chart reviewed, immunizations reconciled, Care Everywhere updated.  Sobeida Dolan LPN  Care Coordinator   [facility]

## 2025-06-03 DIAGNOSIS — F41.9 ANXIETY: ICD-10-CM

## 2025-06-04 RX ORDER — ALPRAZOLAM 0.25 MG/1
0.25 TABLET ORAL DAILY PRN
Qty: 30 TABLET | Refills: 5 | Status: SHIPPED | OUTPATIENT
Start: 2025-06-04

## 2025-08-03 ENCOUNTER — PATIENT MESSAGE (OUTPATIENT)
Dept: URGENT CARE | Facility: CLINIC | Age: 65
End: 2025-08-03